# Patient Record
Sex: FEMALE | Race: WHITE | Employment: PART TIME | URBAN - METROPOLITAN AREA
[De-identification: names, ages, dates, MRNs, and addresses within clinical notes are randomized per-mention and may not be internally consistent; named-entity substitution may affect disease eponyms.]

---

## 2017-04-19 RX ORDER — ESTRADIOL 0.07 MG/D
1 PATCH TRANSDERMAL
COMMUNITY
End: 2017-04-28

## 2017-04-19 RX ORDER — HYDROCHLOROTHIAZIDE 12.5 MG/1
12.5 TABLET ORAL DAILY
COMMUNITY
End: 2017-04-28

## 2017-04-20 ENCOUNTER — HOSPITAL ENCOUNTER (OUTPATIENT)
Dept: LAB | Age: 52
Discharge: HOME | End: 2017-04-20
Payer: COMMERCIAL

## 2017-04-20 ENCOUNTER — HOSPITAL ENCOUNTER (OUTPATIENT)
Dept: OTHER | Age: 52
Discharge: HOME | End: 2017-04-20
Payer: COMMERCIAL

## 2017-04-20 ENCOUNTER — HOSPITAL ENCOUNTER (OUTPATIENT)
Dept: PREADMISSION TESTING | Age: 52
Discharge: HOME | End: 2017-04-20
Payer: COMMERCIAL

## 2017-04-20 DIAGNOSIS — Z01.818 PRE-OP TESTING: ICD-10-CM

## 2017-04-20 DIAGNOSIS — Z01.811 PRE-OP CHEST EXAM: ICD-10-CM

## 2017-04-20 LAB
ABO + RH BLD: NORMAL
ALBUMIN SERPL BCP-MCNC: 3.6 G/DL (ref 3.4–5)
ALBUMIN/GLOB SERPL: 1.4 {RATIO} (ref 0.8–1.7)
ALP SERPL-CCNC: 96 U/L (ref 45–117)
ALT SERPL-CCNC: 29 U/L (ref 13–56)
ANION GAP BLD CALC-SCNC: 10 MMOL/L (ref 3–18)
APPEARANCE UR: CLEAR
APTT PPP: 28.6 SEC (ref 23–36.4)
AST SERPL W P-5'-P-CCNC: 19 U/L (ref 15–37)
BASOPHILS # BLD AUTO: 0 K/UL (ref 0–0.06)
BASOPHILS # BLD: 0 % (ref 0–2)
BILIRUB SERPL-MCNC: 0.2 MG/DL (ref 0.2–1)
BILIRUB UR QL: NEGATIVE
BLOOD GROUP ANTIBODIES SERPL: NORMAL
BUN SERPL-MCNC: 14 MG/DL (ref 7–18)
BUN/CREAT SERPL: 23 (ref 12–20)
CALCIUM SERPL-MCNC: 9.7 MG/DL (ref 8.5–10.1)
CHLORIDE SERPL-SCNC: 104 MMOL/L (ref 100–108)
CO2 SERPL-SCNC: 28 MMOL/L (ref 21–32)
COLOR UR: YELLOW
CREAT SERPL-MCNC: 0.62 MG/DL (ref 0.6–1.3)
DIFFERENTIAL METHOD BLD: NORMAL
EOSINOPHIL # BLD: 0.3 K/UL (ref 0–0.4)
EOSINOPHIL NFR BLD: 4 % (ref 0–5)
ERYTHROCYTE [DISTWIDTH] IN BLOOD BY AUTOMATED COUNT: 13.9 % (ref 11.6–14.5)
GLOBULIN SER CALC-MCNC: 2.6 G/DL (ref 2–4)
GLUCOSE SERPL-MCNC: 116 MG/DL (ref 74–99)
GLUCOSE UR STRIP.AUTO-MCNC: NEGATIVE MG/DL
HCT VFR BLD AUTO: 37.8 % (ref 35–45)
HGB BLD-MCNC: 12.3 G/DL (ref 12–16)
HGB UR QL STRIP: NEGATIVE
INR PPP: 0.9 (ref 0.8–1.2)
KETONES UR QL STRIP.AUTO: NEGATIVE MG/DL
LEUKOCYTE ESTERASE UR QL STRIP.AUTO: NEGATIVE
LYMPHOCYTES # BLD AUTO: 24 % (ref 21–52)
LYMPHOCYTES # BLD: 1.9 K/UL (ref 0.9–3.6)
MCH RBC QN AUTO: 28.9 PG (ref 24–34)
MCHC RBC AUTO-ENTMCNC: 32.5 G/DL (ref 31–37)
MCV RBC AUTO: 88.7 FL (ref 74–97)
MONOCYTES # BLD: 0.4 K/UL (ref 0.05–1.2)
MONOCYTES NFR BLD AUTO: 5 % (ref 3–10)
NEUTS SEG # BLD: 5.1 K/UL (ref 1.8–8)
NEUTS SEG NFR BLD AUTO: 67 % (ref 40–73)
NITRITE UR QL STRIP.AUTO: NEGATIVE
PH UR STRIP: 6.5 [PH] (ref 5–8)
PLATELET # BLD AUTO: 333 K/UL (ref 135–420)
PMV BLD AUTO: 10.1 FL (ref 9.2–11.8)
POTASSIUM SERPL-SCNC: 4 MMOL/L (ref 3.5–5.5)
PROT SERPL-MCNC: 6.2 G/DL (ref 6.4–8.2)
PROT UR STRIP-MCNC: NEGATIVE MG/DL
PROTHROMBIN TIME: 12.2 SEC (ref 11.5–15.2)
RBC # BLD AUTO: 4.26 M/UL (ref 4.2–5.3)
SODIUM SERPL-SCNC: 142 MMOL/L (ref 136–145)
SP GR UR REFRACTOMETRY: 1.01 (ref 1–1.03)
SPECIMEN EXP DATE BLD: NORMAL
UROBILINOGEN UR QL STRIP.AUTO: 0.2 EU/DL (ref 0.2–1)
WBC # BLD AUTO: 7.7 K/UL (ref 4.6–13.2)

## 2017-04-20 PROCEDURE — 86900 BLOOD TYPING SEROLOGIC ABO: CPT | Performed by: NEUROLOGICAL SURGERY

## 2017-04-20 PROCEDURE — 71020 XR CHEST PA LAT: CPT

## 2017-04-20 PROCEDURE — 81003 URINALYSIS AUTO W/O SCOPE: CPT | Performed by: NEUROLOGICAL SURGERY

## 2017-04-20 PROCEDURE — 85025 COMPLETE CBC W/AUTO DIFF WBC: CPT | Performed by: NEUROLOGICAL SURGERY

## 2017-04-20 PROCEDURE — 87086 URINE CULTURE/COLONY COUNT: CPT | Performed by: NEUROLOGICAL SURGERY

## 2017-04-20 PROCEDURE — 80053 COMPREHEN METABOLIC PANEL: CPT | Performed by: NEUROLOGICAL SURGERY

## 2017-04-20 PROCEDURE — 36415 COLL VENOUS BLD VENIPUNCTURE: CPT | Performed by: NEUROLOGICAL SURGERY

## 2017-04-20 PROCEDURE — 85730 THROMBOPLASTIN TIME PARTIAL: CPT | Performed by: NEUROLOGICAL SURGERY

## 2017-04-20 PROCEDURE — 93005 ELECTROCARDIOGRAM TRACING: CPT

## 2017-04-20 PROCEDURE — 85610 PROTHROMBIN TIME: CPT | Performed by: NEUROLOGICAL SURGERY

## 2017-04-22 LAB
BACTERIA SPEC CULT: NORMAL
SERVICE CMNT-IMP: NORMAL

## 2017-04-25 LAB
ATRIAL RATE: 89 BPM
CALCULATED P AXIS, ECG09: 55 DEGREES
CALCULATED R AXIS, ECG10: 38 DEGREES
CALCULATED T AXIS, ECG11: 62 DEGREES
DIAGNOSIS, 93000: NORMAL
P-R INTERVAL, ECG05: 136 MS
Q-T INTERVAL, ECG07: 368 MS
QRS DURATION, ECG06: 88 MS
QTC CALCULATION (BEZET), ECG08: 447 MS
VENTRICULAR RATE, ECG03: 89 BPM

## 2017-04-26 ENCOUNTER — ANESTHESIA EVENT (OUTPATIENT)
Dept: SURGERY | Age: 52
DRG: 460 | End: 2017-04-26
Payer: COMMERCIAL

## 2017-04-27 ENCOUNTER — APPOINTMENT (OUTPATIENT)
Dept: GENERAL RADIOLOGY | Age: 52
DRG: 460 | End: 2017-04-27
Attending: NEUROLOGICAL SURGERY
Payer: COMMERCIAL

## 2017-04-27 ENCOUNTER — HOSPITAL ENCOUNTER (INPATIENT)
Age: 52
LOS: 1 days | Discharge: HOME | DRG: 460 | End: 2017-04-28
Attending: NEUROLOGICAL SURGERY | Admitting: NEUROLOGICAL SURGERY
Payer: COMMERCIAL

## 2017-04-27 ENCOUNTER — ANESTHESIA (OUTPATIENT)
Dept: SURGERY | Age: 52
DRG: 460 | End: 2017-04-27
Payer: COMMERCIAL

## 2017-04-27 PROBLEM — M48.061 LUMBAR STENOSIS: Status: ACTIVE | Noted: 2017-04-27

## 2017-04-27 PROCEDURE — 77030002933 HC SUT MCRYL J&J -A: Performed by: NEUROLOGICAL SURGERY

## 2017-04-27 PROCEDURE — 76210000017 HC OR PH I REC 1.5 TO 2 HR: Performed by: NEUROLOGICAL SURGERY

## 2017-04-27 PROCEDURE — 74011250636 HC RX REV CODE- 250/636

## 2017-04-27 PROCEDURE — 74011000250 HC RX REV CODE- 250

## 2017-04-27 PROCEDURE — 77030018836 HC SOL IRR NACL ICUM -A: Performed by: NEUROLOGICAL SURGERY

## 2017-04-27 PROCEDURE — C1713 ANCHOR/SCREW BN/BN,TIS/BN: HCPCS | Performed by: NEUROLOGICAL SURGERY

## 2017-04-27 PROCEDURE — C1729 CATH, DRAINAGE: HCPCS | Performed by: NEUROLOGICAL SURGERY

## 2017-04-27 PROCEDURE — 74011000250 HC RX REV CODE- 250: Performed by: FAMILY MEDICINE

## 2017-04-27 PROCEDURE — 74011250637 HC RX REV CODE- 250/637: Performed by: NURSE ANESTHETIST, CERTIFIED REGISTERED

## 2017-04-27 PROCEDURE — 77030028990 HC ADH TISS DERMFLX CHMP -B: Performed by: NEUROLOGICAL SURGERY

## 2017-04-27 PROCEDURE — 77030029251 HC SPCR SPN GLMB -K1: Performed by: NEUROLOGICAL SURGERY

## 2017-04-27 PROCEDURE — 77030018673: Performed by: NEUROLOGICAL SURGERY

## 2017-04-27 PROCEDURE — 74011000250 HC RX REV CODE- 250: Performed by: ANESTHESIOLOGY

## 2017-04-27 PROCEDURE — 77030011265 HC ELECTRD BLD HEX COVD -A: Performed by: NEUROLOGICAL SURGERY

## 2017-04-27 PROCEDURE — 77030011264 HC ELECTRD BLD EXT COVD -A: Performed by: NEUROLOGICAL SURGERY

## 2017-04-27 PROCEDURE — 77030033827 HC SLV COMPR SCD THGH COVD -B: Performed by: NEUROLOGICAL SURGERY

## 2017-04-27 PROCEDURE — 77030018846 HC SOL IRR STRL H20 ICUM -A: Performed by: NEUROLOGICAL SURGERY

## 2017-04-27 PROCEDURE — 77030005515 HC CATH URETH FOL14 BARD -B: Performed by: NEUROLOGICAL SURGERY

## 2017-04-27 PROCEDURE — 77030027138 HC INCENT SPIROMETER -A

## 2017-04-27 PROCEDURE — 74011250636 HC RX REV CODE- 250/636: Performed by: FAMILY MEDICINE

## 2017-04-27 PROCEDURE — 74011250637 HC RX REV CODE- 250/637: Performed by: ANESTHESIOLOGY

## 2017-04-27 PROCEDURE — 74011250636 HC RX REV CODE- 250/636: Performed by: NURSE ANESTHETIST, CERTIFIED REGISTERED

## 2017-04-27 PROCEDURE — 65270000029 HC RM PRIVATE

## 2017-04-27 PROCEDURE — 77030019908 HC STETH ESOPH SIMS -A: Performed by: ANESTHESIOLOGY

## 2017-04-27 PROCEDURE — 77030008477 HC STYL SATN SLP COVD -A: Performed by: ANESTHESIOLOGY

## 2017-04-27 PROCEDURE — 77030018723 HC ELCTRD BLD COVD -A: Performed by: NEUROLOGICAL SURGERY

## 2017-04-27 PROCEDURE — 0ST20ZZ RESECTION OF LUMBAR VERTEBRAL DISC, OPEN APPROACH: ICD-10-PCS | Performed by: NEUROLOGICAL SURGERY

## 2017-04-27 PROCEDURE — 74011250637 HC RX REV CODE- 250/637: Performed by: NEUROLOGICAL SURGERY

## 2017-04-27 PROCEDURE — 76060000039 HC ANESTHESIA 4 TO 4.5 HR: Performed by: NEUROLOGICAL SURGERY

## 2017-04-27 PROCEDURE — 77030003028 HC SUT VCRL J&J -A: Performed by: NEUROLOGICAL SURGERY

## 2017-04-27 PROCEDURE — 74011250636 HC RX REV CODE- 250/636: Performed by: NEUROLOGICAL SURGERY

## 2017-04-27 PROCEDURE — 77030020269 HC MISC IMPL: Performed by: NEUROLOGICAL SURGERY

## 2017-04-27 PROCEDURE — 77030012406 HC DRN WND PENRS BARD -A: Performed by: NEUROLOGICAL SURGERY

## 2017-04-27 PROCEDURE — 77030003029 HC SUT VCRL J&J -B: Performed by: NEUROLOGICAL SURGERY

## 2017-04-27 PROCEDURE — 77030034169 HC GRFT BN BIOACTV INTRFC 1G BSTEB -F: Performed by: NEUROLOGICAL SURGERY

## 2017-04-27 PROCEDURE — 77030031359 HC BLD BN MILL DISP STRY -E: Performed by: NEUROLOGICAL SURGERY

## 2017-04-27 PROCEDURE — 77030018842 HC SOL IRR SOD CL 9% BAXT -A: Performed by: NEUROLOGICAL SURGERY

## 2017-04-27 PROCEDURE — 77030004391 HC BUR FLUT MEDT -C: Performed by: NEUROLOGICAL SURGERY

## 2017-04-27 PROCEDURE — 74011000272 HC RX REV CODE- 272: Performed by: NEUROLOGICAL SURGERY

## 2017-04-27 PROCEDURE — 77030012602 HC SPNG PTTY NEUR J&J -B: Performed by: NEUROLOGICAL SURGERY

## 2017-04-27 PROCEDURE — 74011000250 HC RX REV CODE- 250: Performed by: NEUROLOGICAL SURGERY

## 2017-04-27 PROCEDURE — 00BT0ZZ EXCISION OF SPINAL MENINGES, OPEN APPROACH: ICD-10-PCS | Performed by: NEUROLOGICAL SURGERY

## 2017-04-27 PROCEDURE — 77030021510 HC CLP MNTR NEURO M5 NUVA -E: Performed by: NEUROLOGICAL SURGERY

## 2017-04-27 PROCEDURE — 77030010507 HC ADH SKN DERMBND J&J -B: Performed by: NEUROLOGICAL SURGERY

## 2017-04-27 PROCEDURE — 77030013079 HC BLNKT BAIR HGGR 3M -A: Performed by: ANESTHESIOLOGY

## 2017-04-27 PROCEDURE — 0SG00A1 FUSION LUM JT W INTBD FUS DEV, POST APPR P COL, OPEN: ICD-10-PCS | Performed by: NEUROLOGICAL SURGERY

## 2017-04-27 PROCEDURE — 77030032490 HC SLV COMPR SCD KNE COVD -B: Performed by: NEUROLOGICAL SURGERY

## 2017-04-27 PROCEDURE — 74011000250 HC RX REV CODE- 250: Performed by: NURSE ANESTHETIST, CERTIFIED REGISTERED

## 2017-04-27 PROCEDURE — 77030008683 HC TU ET CUF COVD -A: Performed by: ANESTHESIOLOGY

## 2017-04-27 PROCEDURE — 77030011640 HC PAD GRND REM COVD -A: Performed by: NEUROLOGICAL SURGERY

## 2017-04-27 PROCEDURE — 77030031878 HC NDL SPN ACC SYS I-PAS NUVA -D: Performed by: NEUROLOGICAL SURGERY

## 2017-04-27 PROCEDURE — C9113 INJ PANTOPRAZOLE SODIUM, VIA: HCPCS | Performed by: FAMILY MEDICINE

## 2017-04-27 PROCEDURE — 76010000175 HC OR TIME 4 TO 4.5 HR INTENSV-TIER 1: Performed by: NEUROLOGICAL SURGERY

## 2017-04-27 DEVICE — CALIBER SPACER 10 X 26MM, 9-13MM
Type: IMPLANTABLE DEVICE | Site: SPINE LUMBAR | Status: FUNCTIONAL
Brand: CALIBER

## 2017-04-27 DEVICE — INTERFACE IS A SYNTHETIC BIOACTIVE BONE GRAFT FOR USE IN THE REPAIR OF OSSEOUS DEFECTS. IT IS SUPPLIED AS IRREGULAR SYNTHETIC GRANULES OF BIOACTIVE GLASS (45S5 BIOGLASS), SIZED FROM 200 MICRONS TO 420 MICRONS. WHEN IMPLANTED IN LIVING TISSUE, THE MATERIAL UNDERGOES A TIME DEPENDENT SURFACE MODIFICATION. THE SURFACE REACTION RESULTS IN THE FORMATION OF A CALCIUM PHOSPHATE LAYER, WHICH IS EQUIVALENT IN COMPOSITION AND STRUCTURE TO THE HYDROXYAPATITE FOUND IN BONE MINERAL. THE BIOLOGICAL APATITE LAYER OF THE GRANULES PROVIDES AN OSTEOCONDUCTIVE SCAFFOLD FOR THE GENERATION OF NEW OSSEOUS TISSUE. NEW BONE INFILTRATES AROUND THE GRANULES ALLOWING THE REPAIR OF THE DEFECT AS THE GRANULES ARE ABSORBED.
Type: IMPLANTABLE DEVICE | Site: SPINE LUMBAR | Status: FUNCTIONAL
Brand: INTERFACE

## 2017-04-27 DEVICE — 5.5MM CURVED ROD, TITANIUM ALLOY, 55MM LENGTH
Type: IMPLANTABLE DEVICE | Site: SPINE LUMBAR | Status: FUNCTIONAL
Brand: CREO

## 2017-04-27 DEVICE — GRAFT BNE SUB 7.5GM PTTY SYN SIGNAFUSE: Type: IMPLANTABLE DEVICE | Site: SPINE LUMBAR | Status: FUNCTIONAL

## 2017-04-27 DEVICE — 5.5 LOCKING CAP, CREO
Type: IMPLANTABLE DEVICE | Site: SPINE LUMBAR | Status: FUNCTIONAL
Brand: CREO

## 2017-04-27 RX ORDER — SODIUM CHLORIDE, SODIUM LACTATE, POTASSIUM CHLORIDE, CALCIUM CHLORIDE 600; 310; 30; 20 MG/100ML; MG/100ML; MG/100ML; MG/100ML
75 INJECTION, SOLUTION INTRAVENOUS CONTINUOUS
Status: DISCONTINUED | OUTPATIENT
Start: 2017-04-27 | End: 2017-04-27 | Stop reason: HOSPADM

## 2017-04-27 RX ORDER — MORPHINE SULFATE 10 MG/ML
4-6 INJECTION, SOLUTION INTRAMUSCULAR; INTRAVENOUS
Status: DISCONTINUED | OUTPATIENT
Start: 2017-04-27 | End: 2017-04-28 | Stop reason: HOSPADM

## 2017-04-27 RX ORDER — DEXAMETHASONE SODIUM PHOSPHATE 4 MG/ML
4 INJECTION, SOLUTION INTRA-ARTICULAR; INTRALESIONAL; INTRAMUSCULAR; INTRAVENOUS; SOFT TISSUE EVERY 8 HOURS
Status: COMPLETED | OUTPATIENT
Start: 2017-04-27 | End: 2017-04-28

## 2017-04-27 RX ORDER — ONDANSETRON 2 MG/ML
INJECTION INTRAMUSCULAR; INTRAVENOUS AS NEEDED
Status: DISCONTINUED | OUTPATIENT
Start: 2017-04-27 | End: 2017-04-27 | Stop reason: HOSPADM

## 2017-04-27 RX ORDER — DOCUSATE SODIUM 100 MG/1
100 CAPSULE, LIQUID FILLED ORAL 2 TIMES DAILY
Status: DISCONTINUED | OUTPATIENT
Start: 2017-04-27 | End: 2017-04-28 | Stop reason: HOSPADM

## 2017-04-27 RX ORDER — ONDANSETRON 2 MG/ML
4 INJECTION INTRAMUSCULAR; INTRAVENOUS
Status: DISCONTINUED | OUTPATIENT
Start: 2017-04-27 | End: 2017-04-28 | Stop reason: HOSPADM

## 2017-04-27 RX ORDER — SUCCINYLCHOLINE CHLORIDE 20 MG/ML
INJECTION INTRAMUSCULAR; INTRAVENOUS AS NEEDED
Status: DISCONTINUED | OUTPATIENT
Start: 2017-04-27 | End: 2017-04-27 | Stop reason: HOSPADM

## 2017-04-27 RX ORDER — HYDROCHLOROTHIAZIDE 25 MG/1
12.5 TABLET ORAL DAILY
Status: DISCONTINUED | OUTPATIENT
Start: 2017-04-28 | End: 2017-04-28 | Stop reason: HOSPADM

## 2017-04-27 RX ORDER — SCOLOPAMINE TRANSDERMAL SYSTEM 1 MG/1
1.5 PATCH, EXTENDED RELEASE TRANSDERMAL ONCE
Status: COMPLETED | OUTPATIENT
Start: 2017-04-27 | End: 2017-04-28

## 2017-04-27 RX ORDER — SODIUM CHLORIDE 0.9 % (FLUSH) 0.9 %
5-10 SYRINGE (ML) INJECTION AS NEEDED
Status: DISCONTINUED | OUTPATIENT
Start: 2017-04-27 | End: 2017-04-27 | Stop reason: HOSPADM

## 2017-04-27 RX ORDER — FENTANYL CITRATE 50 UG/ML
INJECTION, SOLUTION INTRAMUSCULAR; INTRAVENOUS AS NEEDED
Status: DISCONTINUED | OUTPATIENT
Start: 2017-04-27 | End: 2017-04-27 | Stop reason: HOSPADM

## 2017-04-27 RX ORDER — ONDANSETRON 2 MG/ML
4 INJECTION INTRAMUSCULAR; INTRAVENOUS
Status: DISCONTINUED | OUTPATIENT
Start: 2017-04-27 | End: 2017-04-27 | Stop reason: HOSPADM

## 2017-04-27 RX ORDER — ESMOLOL HYDROCHLORIDE 10 MG/ML
INJECTION INTRAVENOUS AS NEEDED
Status: DISCONTINUED | OUTPATIENT
Start: 2017-04-27 | End: 2017-04-27 | Stop reason: HOSPADM

## 2017-04-27 RX ORDER — DEXAMETHASONE SODIUM PHOSPHATE 4 MG/ML
INJECTION, SOLUTION INTRA-ARTICULAR; INTRALESIONAL; INTRAMUSCULAR; INTRAVENOUS; SOFT TISSUE AS NEEDED
Status: DISCONTINUED | OUTPATIENT
Start: 2017-04-27 | End: 2017-04-27 | Stop reason: HOSPADM

## 2017-04-27 RX ORDER — SODIUM CHLORIDE, SODIUM LACTATE, POTASSIUM CHLORIDE, CALCIUM CHLORIDE 600; 310; 30; 20 MG/100ML; MG/100ML; MG/100ML; MG/100ML
25 INJECTION, SOLUTION INTRAVENOUS CONTINUOUS
Status: DISCONTINUED | OUTPATIENT
Start: 2017-04-27 | End: 2017-04-27 | Stop reason: HOSPADM

## 2017-04-27 RX ORDER — FAMOTIDINE 20 MG/1
20 TABLET, FILM COATED ORAL ONCE
Status: COMPLETED | OUTPATIENT
Start: 2017-04-27 | End: 2017-04-27

## 2017-04-27 RX ORDER — MIDAZOLAM HYDROCHLORIDE 1 MG/ML
INJECTION, SOLUTION INTRAMUSCULAR; INTRAVENOUS AS NEEDED
Status: DISCONTINUED | OUTPATIENT
Start: 2017-04-27 | End: 2017-04-27 | Stop reason: HOSPADM

## 2017-04-27 RX ORDER — VANCOMYCIN HYDROCHLORIDE 1 G/20ML
INJECTION, POWDER, LYOPHILIZED, FOR SOLUTION INTRAVENOUS AS NEEDED
Status: DISCONTINUED | OUTPATIENT
Start: 2017-04-27 | End: 2017-04-27 | Stop reason: HOSPADM

## 2017-04-27 RX ORDER — SODIUM CHLORIDE 0.9 % (FLUSH) 0.9 %
5-10 SYRINGE (ML) INJECTION AS NEEDED
Status: DISCONTINUED | OUTPATIENT
Start: 2017-04-27 | End: 2017-04-28 | Stop reason: HOSPADM

## 2017-04-27 RX ORDER — LIDOCAINE HYDROCHLORIDE 20 MG/ML
INJECTION, SOLUTION EPIDURAL; INFILTRATION; INTRACAUDAL; PERINEURAL AS NEEDED
Status: DISCONTINUED | OUTPATIENT
Start: 2017-04-27 | End: 2017-04-27 | Stop reason: HOSPADM

## 2017-04-27 RX ORDER — METOPROLOL TARTRATE 5 MG/5ML
2 INJECTION INTRAVENOUS
Status: COMPLETED | OUTPATIENT
Start: 2017-04-27 | End: 2017-04-27

## 2017-04-27 RX ORDER — ACETAMINOPHEN 325 MG/1
650 TABLET ORAL
Status: DISCONTINUED | OUTPATIENT
Start: 2017-04-27 | End: 2017-04-28 | Stop reason: HOSPADM

## 2017-04-27 RX ORDER — HYDROMORPHONE HYDROCHLORIDE 1 MG/ML
0.5 INJECTION, SOLUTION INTRAMUSCULAR; INTRAVENOUS; SUBCUTANEOUS
Status: DISCONTINUED | OUTPATIENT
Start: 2017-04-27 | End: 2017-04-27 | Stop reason: HOSPADM

## 2017-04-27 RX ORDER — HYDRALAZINE HYDROCHLORIDE 20 MG/ML
INJECTION INTRAMUSCULAR; INTRAVENOUS AS NEEDED
Status: DISCONTINUED | OUTPATIENT
Start: 2017-04-27 | End: 2017-04-27 | Stop reason: HOSPADM

## 2017-04-27 RX ORDER — SODIUM CHLORIDE 0.9 % (FLUSH) 0.9 %
5-10 SYRINGE (ML) INJECTION EVERY 8 HOURS
Status: DISCONTINUED | OUTPATIENT
Start: 2017-04-27 | End: 2017-04-27 | Stop reason: HOSPADM

## 2017-04-27 RX ORDER — PROPOFOL 10 MG/ML
INJECTION, EMULSION INTRAVENOUS AS NEEDED
Status: DISCONTINUED | OUTPATIENT
Start: 2017-04-27 | End: 2017-04-27 | Stop reason: HOSPADM

## 2017-04-27 RX ORDER — LIDOCAINE HYDROCHLORIDE 10 MG/ML
0.1 INJECTION, SOLUTION EPIDURAL; INFILTRATION; INTRACAUDAL; PERINEURAL AS NEEDED
Status: DISCONTINUED | OUTPATIENT
Start: 2017-04-27 | End: 2017-04-27 | Stop reason: HOSPADM

## 2017-04-27 RX ORDER — CEFAZOLIN SODIUM 2 G/50ML
2 SOLUTION INTRAVENOUS
Status: COMPLETED | OUTPATIENT
Start: 2017-04-27 | End: 2017-04-27

## 2017-04-27 RX ORDER — HYDROMORPHONE HYDROCHLORIDE 2 MG/ML
INJECTION, SOLUTION INTRAMUSCULAR; INTRAVENOUS; SUBCUTANEOUS AS NEEDED
Status: DISCONTINUED | OUTPATIENT
Start: 2017-04-27 | End: 2017-04-27 | Stop reason: HOSPADM

## 2017-04-27 RX ORDER — DIAZEPAM 5 MG/1
5 TABLET ORAL
Status: DISCONTINUED | OUTPATIENT
Start: 2017-04-27 | End: 2017-04-28 | Stop reason: HOSPADM

## 2017-04-27 RX ORDER — METOPROLOL TARTRATE 5 MG/5ML
INJECTION INTRAVENOUS
Status: COMPLETED
Start: 2017-04-27 | End: 2017-04-27

## 2017-04-27 RX ORDER — SODIUM CHLORIDE 0.9 % (FLUSH) 0.9 %
5-10 SYRINGE (ML) INJECTION EVERY 8 HOURS
Status: DISCONTINUED | OUTPATIENT
Start: 2017-04-27 | End: 2017-04-28 | Stop reason: HOSPADM

## 2017-04-27 RX ORDER — DEXTROSE, SODIUM CHLORIDE, AND POTASSIUM CHLORIDE 5; .45; .15 G/100ML; G/100ML; G/100ML
75 INJECTION INTRAVENOUS CONTINUOUS
Status: DISCONTINUED | OUTPATIENT
Start: 2017-04-27 | End: 2017-04-28 | Stop reason: HOSPADM

## 2017-04-27 RX ORDER — CEFAZOLIN SODIUM 2 G/50ML
2 SOLUTION INTRAVENOUS EVERY 8 HOURS
Status: COMPLETED | OUTPATIENT
Start: 2017-04-27 | End: 2017-04-28

## 2017-04-27 RX ORDER — OXYCODONE AND ACETAMINOPHEN 5; 325 MG/1; MG/1
1-2 TABLET ORAL
Status: DISCONTINUED | OUTPATIENT
Start: 2017-04-27 | End: 2017-04-28 | Stop reason: HOSPADM

## 2017-04-27 RX ADMIN — PROPOFOL 180 MG: 10 INJECTION, EMULSION INTRAVENOUS at 14:37

## 2017-04-27 RX ADMIN — METOPROLOL TARTRATE 2 MG: 5 INJECTION INTRAVENOUS at 18:59

## 2017-04-27 RX ADMIN — SODIUM CHLORIDE 40 MG: 9 INJECTION, SOLUTION INTRAMUSCULAR; INTRAVENOUS; SUBCUTANEOUS at 22:32

## 2017-04-27 RX ADMIN — DEXTROSE MONOHYDRATE, SODIUM CHLORIDE, AND POTASSIUM CHLORIDE 75 ML/HR: 50; 4.5; 1.49 INJECTION, SOLUTION INTRAVENOUS at 22:28

## 2017-04-27 RX ADMIN — DEXAMETHASONE SODIUM PHOSPHATE 10 MG: 4 INJECTION, SOLUTION INTRA-ARTICULAR; INTRALESIONAL; INTRAMUSCULAR; INTRAVENOUS; SOFT TISSUE at 14:40

## 2017-04-27 RX ADMIN — HYDRALAZINE HYDROCHLORIDE 4 MG: 20 INJECTION INTRAMUSCULAR; INTRAVENOUS at 18:02

## 2017-04-27 RX ADMIN — DOCUSATE SODIUM 100 MG: 100 CAPSULE, LIQUID FILLED ORAL at 22:31

## 2017-04-27 RX ADMIN — ESMOLOL HYDROCHLORIDE 30 MG: 10 INJECTION INTRAVENOUS at 18:09

## 2017-04-27 RX ADMIN — SUCCINYLCHOLINE CHLORIDE 80 MG: 20 INJECTION INTRAMUSCULAR; INTRAVENOUS at 14:37

## 2017-04-27 RX ADMIN — HYDRALAZINE HYDROCHLORIDE 4 MG: 20 INJECTION INTRAMUSCULAR; INTRAVENOUS at 17:29

## 2017-04-27 RX ADMIN — LIDOCAINE HYDROCHLORIDE 50 MG: 20 INJECTION, SOLUTION EPIDURAL; INFILTRATION; INTRACAUDAL; PERINEURAL at 14:37

## 2017-04-27 RX ADMIN — FAMOTIDINE 20 MG: 20 TABLET, FILM COATED ORAL at 10:58

## 2017-04-27 RX ADMIN — ONDANSETRON 4 MG: 2 INJECTION INTRAMUSCULAR; INTRAVENOUS at 14:41

## 2017-04-27 RX ADMIN — HYDROMORPHONE HYDROCHLORIDE 1.6 MG: 2 INJECTION, SOLUTION INTRAMUSCULAR; INTRAVENOUS; SUBCUTANEOUS at 18:47

## 2017-04-27 RX ADMIN — PROPOFOL 100 MG: 10 INJECTION, EMULSION INTRAVENOUS at 16:12

## 2017-04-27 RX ADMIN — METOPROLOL TARTRATE 2 MG: 5 INJECTION INTRAVENOUS at 19:38

## 2017-04-27 RX ADMIN — SODIUM CHLORIDE, SODIUM LACTATE, POTASSIUM CHLORIDE, AND CALCIUM CHLORIDE: 600; 310; 30; 20 INJECTION, SOLUTION INTRAVENOUS at 17:50

## 2017-04-27 RX ADMIN — ESMOLOL HYDROCHLORIDE 30 MG: 10 INJECTION INTRAVENOUS at 17:58

## 2017-04-27 RX ADMIN — ESMOLOL HYDROCHLORIDE 30 MG: 10 INJECTION INTRAVENOUS at 17:53

## 2017-04-27 RX ADMIN — CEFAZOLIN SODIUM 2 G: 2 SOLUTION INTRAVENOUS at 18:27

## 2017-04-27 RX ADMIN — SODIUM CHLORIDE, SODIUM LACTATE, POTASSIUM CHLORIDE, AND CALCIUM CHLORIDE: 600; 310; 30; 20 INJECTION, SOLUTION INTRAVENOUS at 14:32

## 2017-04-27 RX ADMIN — SODIUM CHLORIDE, SODIUM LACTATE, POTASSIUM CHLORIDE, AND CALCIUM CHLORIDE 75 ML/HR: 600; 310; 30; 20 INJECTION, SOLUTION INTRAVENOUS at 19:23

## 2017-04-27 RX ADMIN — METOPROLOL TARTRATE 1 MG: 5 INJECTION INTRAVENOUS at 20:14

## 2017-04-27 RX ADMIN — DEXAMETHASONE SODIUM PHOSPHATE 4 MG: 4 INJECTION, SOLUTION INTRAMUSCULAR; INTRAVENOUS at 23:45

## 2017-04-27 RX ADMIN — Medication 10 ML: at 22:00

## 2017-04-27 RX ADMIN — CEFAZOLIN SODIUM 2 G: 2 SOLUTION INTRAVENOUS at 15:03

## 2017-04-27 RX ADMIN — METOPROLOL TARTRATE 1 MG: 5 INJECTION INTRAVENOUS at 19:19

## 2017-04-27 RX ADMIN — METOPROLOL TARTRATE 2 MG: 5 INJECTION INTRAVENOUS at 19:57

## 2017-04-27 RX ADMIN — FENTANYL CITRATE 50 MCG: 50 INJECTION, SOLUTION INTRAMUSCULAR; INTRAVENOUS at 17:24

## 2017-04-27 RX ADMIN — FENTANYL CITRATE 50 MCG: 50 INJECTION, SOLUTION INTRAMUSCULAR; INTRAVENOUS at 14:32

## 2017-04-27 RX ADMIN — FENTANYL CITRATE 50 MCG: 50 INJECTION, SOLUTION INTRAMUSCULAR; INTRAVENOUS at 17:07

## 2017-04-27 RX ADMIN — FENTANYL CITRATE 50 MCG: 50 INJECTION, SOLUTION INTRAMUSCULAR; INTRAVENOUS at 14:37

## 2017-04-27 RX ADMIN — SODIUM CHLORIDE, SODIUM LACTATE, POTASSIUM CHLORIDE, AND CALCIUM CHLORIDE: 600; 310; 30; 20 INJECTION, SOLUTION INTRAVENOUS at 15:52

## 2017-04-27 RX ADMIN — HYDRALAZINE HYDROCHLORIDE 4 MG: 20 INJECTION INTRAMUSCULAR; INTRAVENOUS at 17:38

## 2017-04-27 RX ADMIN — FENTANYL CITRATE 50 MCG: 50 INJECTION, SOLUTION INTRAMUSCULAR; INTRAVENOUS at 18:47

## 2017-04-27 RX ADMIN — PROPOFOL 20 MG: 10 INJECTION, EMULSION INTRAVENOUS at 14:45

## 2017-04-27 RX ADMIN — CEFAZOLIN SODIUM 2 G: 2 SOLUTION INTRAVENOUS at 22:29

## 2017-04-27 RX ADMIN — HYDROMORPHONE HYDROCHLORIDE 0.4 MG: 2 INJECTION, SOLUTION INTRAMUSCULAR; INTRAVENOUS; SUBCUTANEOUS at 14:32

## 2017-04-27 RX ADMIN — METOPROLOL TARTRATE 2 MG: 5 INJECTION INTRAVENOUS at 19:08

## 2017-04-27 RX ADMIN — FENTANYL CITRATE 50 MCG: 50 INJECTION, SOLUTION INTRAMUSCULAR; INTRAVENOUS at 17:49

## 2017-04-27 RX ADMIN — MIDAZOLAM HYDROCHLORIDE 2 MG: 1 INJECTION, SOLUTION INTRAMUSCULAR; INTRAVENOUS at 14:32

## 2017-04-27 NOTE — BRIEF OP NOTE
BRIEF OPERATIVE NOTE    Date of Procedure: 4/27/2017   Preoperative Diagnosis: lumbar stenosis   m48.06  Postoperative Diagnosis: lumbar stenosis   m48.06    Procedure(s):  minimally invasive right lumbar 4 - lumbar 5 laminotomies,cyst resection  and TRANSFORAMINAL LUMBAR INTERBODY FUSION ,pedicle screw stabilization  Surgeon(s) and Role:     * Janiya Harrison MD - Primary         Assistant Staff:       Surgical Staff:  Circ-1: Adam Block RN  Circ-Relief: José Miguel Lott RN; Eugenio Quintero RN  Radiology Technician: Nella Hill  Scrub Tech-1: Karen Augustine; Mikaela Frost  Scrub Tech-2: Virgle Emigdio  Surg Asst-1: Andrea Lucian  Event Time In   Incision Start 1509   Incision Close 1820     Anesthesia: General   Estimated Blood Loss: 50  Specimens: * No specimens in log *   Findings: -   Complications: --  Implants:   Implant Name Type Inv.  Item Serial No.  Lot No. LRB No. Used Action   GRAFT BNE BIOACTV INTERFC 1GM --  - [id number]  GRAFT BNE BIOACTV INTERFC 1GM --   [employer] [id number] Right 1 Implanted   GRAFT BNE PUTTY BIOACTV 7.5GM -- SIGNAFUSE - [id number]  GRAFT BNE PUTTY BIOACTV 7.5GM -- SIGNAFUSE  [employer] [id number] Right 1 Implanted   CAP SPNE LCK CREO 5.5MM --  - [id number]  CAP SPNE LCK CREO 5.5MM --   [employer] [id number] Right 1 Implanted   ESTEPHANIE SPNE CRV TI 5.5X55MM -- CREO - [id number]  ESTEPHANIE SPNE CRV TI 5.5X55MM -- CREO  [employer] 1111 Right 1 Implanted   SPACER SPNE [id number] -- CALIBER - [id number]  SPACER SPNE [id number] -- CALIBER  [employer] 1111 Right 1 Implanted   6.5 x 50mm Creo MIS screw    [employer] 1111 Right 1 Implanted   6.5 x 55mm Creo MIS Screw         1111 Right 1 Implanted

## 2017-04-27 NOTE — ANESTHESIA PREPROCEDURE EVALUATION
Anesthetic History     PONV          Review of Systems / Medical History  Patient summary reviewed and pertinent labs reviewed    Pulmonary  Within defined limits                 Neuro/Psych              Cardiovascular    Hypertension                   GI/Hepatic/Renal     GERD           Endo/Other        Arthritis     Other Findings   Comments:   Risk Factors for Postoperative nausea/vomiting:       History of postoperative nausea/vomiting? YES       Female? YES       Motion sickness? NO       Intended opioid administration for postoperative analgesia?   YES           Physical Exam    Airway  Mallampati: II  TM Distance: 4 - 6 cm  Neck ROM: normal range of motion   Mouth opening: Normal     Cardiovascular    Rhythm: regular  Rate: normal         Dental    Dentition: Poor dentition     Pulmonary  Breath sounds clear to auscultation               Abdominal  GI exam deferred       Other Findings            Anesthetic Plan    ASA: 2  Anesthesia type: general          Induction: Intravenous  Anesthetic plan and risks discussed with: Patient

## 2017-04-27 NOTE — H&P
History and Physical reviewed; I have examined the patient and there are no pertinent changes.     Tawanna Pisano MD   1:38 PM 4/27/2017

## 2017-04-27 NOTE — IP AVS SNAPSHOT
Current Discharge Medication List  
  
START taking these medications Dose & Instructions Dispensing Information Comments Morning Noon Evening Bedtime  
 methocarbamol 500 mg tablet Commonly known as:  ROBAXIN Your last dose was: Your next dose is:    
   
   
 Dose:  500 mg Take 1 Tab by mouth four (4) times daily as needed. Indications: MUSCLE SPASM Quantity:  20 Tab Refills:  0  
     
   
   
   
  
 oxyCODONE-acetaminophen 5-325 mg per tablet Commonly known as:  PERCOCET Your last dose was: Your next dose is:    
   
   
 Dose:  1-2 Tab Take 1-2 Tabs by mouth every four (4) hours as needed. Max Daily Amount: 12 Tabs. Indications: Pain Quantity:  30 Tab Refills:  0 CONTINUE these medications which have CHANGED Dose & Instructions Dispensing Information Comments Morning Noon Evening Bedtime  
 estradiol 0.075 mg/24 hr  
Commonly known as:  Сергей Glass What changed:  Another medication with the same name was removed. Continue taking this medication, and follow the directions you see here. Your last dose was: Your next dose is:    
   
   
  Refills:  0  
     
   
   
   
  
 hydroCHLOROthiazide 12.5 mg capsule Commonly known as:  Devora Van What changed:  Another medication with the same name was removed. Continue taking this medication, and follow the directions you see here. Your last dose was: Your next dose is:    
   
   
  Refills:  0 CONTINUE these medications which have NOT CHANGED Dose & Instructions Dispensing Information Comments Morning Noon Evening Bedtime  
 dicloxacillin 500 mg capsule Commonly known as:  Brooklyn Waldrop Your last dose was: Your next dose is:    
   
   
  Refills:  0  
     
   
   
   
  
 omeprazole 20 mg capsule Commonly known as:  PRILOSEC Your last dose was: Your next dose is: Dose:  20 mg Take 20 mg by mouth daily. Refills:  0  
     
   
   
   
  
 THERAGRAN PO Your last dose was: Your next dose is: Take  by mouth. Refills:  0  
     
   
   
   
  
 VITAMIN D2 50,000 unit capsule Generic drug:  ergocalciferol Your last dose was: Your next dose is:    
   
   
 two (2) times a week. Refills:  0 STOP taking these medications FISH OIL 1,000 mg (120 mg-180 mg) Cap Generic drug:  Omega-3-DHA-EPA-Fish Oil  
   
  
 naproxen sodium 220 mg Cap PRILOSEC OTC PO Where to Get Your Medications Information on where to get these meds will be given to you by the nurse or doctor. ! Ask your nurse or doctor about these medications  
  methocarbamol 500 mg tablet  
 oxyCODONE-acetaminophen 5-325 mg per tablet

## 2017-04-27 NOTE — IP AVS SNAPSHOT
[address] Patient: Charley Albarado MRN: [id number] WPI:[birth year]75[birth year] You are allergic to the following No active allergies Recent Documentation Height Weight BMI OB Status Smoking Status 1.753 m 95.3 kg 31.01 kg/m2 Hysterectomy Never Smoker Emergency Contacts Name Discharge Info Relation Home Work Mobile [address] A DISCHARGE CAREGIVER [3] Spouse [3] [phone] About your hospitalization You were admitted on:  April 27, 2017 You last received care in the:  [address] [place] [address],[address] You were discharged on:  April 28, 2017 Unit phone number:  [phone] Why you were hospitalized Your primary diagnosis was:  Not on File Your diagnoses also included:  Lumbar Stenosis Providers Seen During Your Hospitalizations Provider Role Specialty Primary office phone Aaliyah Israel MD Attending Provider Neurosurgery [phone] Your Primary Care Physician (PCP) Primary Care Physician Office Phone Office Fax [address] Follow-up Information Follow up With Details Comments Contact Info Lexis Verdin MD   [address] [place] [address] 
[phone] Aaliyah Israel MD Schedule an appointment as soon as possible for a visit  [address] [place] [address] [phone] Current Discharge Medication List  
  
START taking these medications Dose & Instructions Dispensing Information Comments Morning Noon Evening Bedtime  
 methocarbamol 500 mg tablet Commonly known as:  ROBAXIN Your last dose was: Your next dose is:    
   
   
 Dose:  500 mg Take 1 Tab by mouth four (4) times daily as needed. Indications: MUSCLE SPASM Quantity:  20 Tab Refills:  0 oxyCODONE-acetaminophen 5-325 mg per tablet Commonly known as:  PERCOCET Your last dose was: Your next dose is:    
   
   
 Dose:  1-2 Tab Take 1-2 Tabs by mouth every four (4) hours as needed. Max Daily Amount: 12 Tabs. Indications: Pain Quantity:  30 Tab Refills:  0 CONTINUE these medications which have CHANGED Dose & Instructions Dispensing Information Comments Morning Noon Evening Bedtime  
 estradiol 0.075 mg/24 hr  
Commonly known as:  Orysia Abide What changed:  Another medication with the same name was removed. Continue taking this medication, and follow the directions you see here. Your last dose was: Your next dose is:    
   
   
  Refills:  0  
     
   
   
   
  
 hydroCHLOROthiazide 12.5 mg capsule Commonly known as:  Chrystal Menendez What changed:  Another medication with the same name was removed. Continue taking this medication, and follow the directions you see here. Your last dose was: Your next dose is:    
   
   
  Refills:  0 CONTINUE these medications which have NOT CHANGED Dose & Instructions Dispensing Information Comments Morning Noon Evening Bedtime  
 dicloxacillin 500 mg capsule Commonly known as:  Asher Sextonin Your last dose was: Your next dose is:    
   
   
  Refills:  0  
     
   
   
   
  
 omeprazole 20 mg capsule Commonly known as:  PRILOSEC Your last dose was: Your next dose is:    
   
   
 Dose:  20 mg Take 20 mg by mouth daily. Refills:  0  
     
   
   
   
  
 THERAGRAN PO Your last dose was: Your next dose is: Take  by mouth. Refills:  0  
     
   
   
   
  
 VITAMIN D2 50,000 unit capsule Generic drug:  ergocalciferol Your last dose was: Your next dose is:    
   
   
 two (2) times a week. Refills:  0 STOP taking these medications FISH OIL 1,000 mg (120 mg-180 mg) Cap Generic drug:  Omega-3-DHA-EPA-Fish Oil  
   
  
 naproxen sodium 220 mg Cap PRILOSEC OTC PO Where to Get Your Medications Information on where to get these meds will be given to you by the nurse or doctor. ! Ask your nurse or doctor about these medications  
  methocarbamol 500 mg tablet  
 oxyCODONE-acetaminophen 5-325 mg per tablet Discharge Instructions Office:  [phone] Lumbar Spine Discharge Instructions *YOU MUST AVOID SMOKING OR BEING AROUND ANYONE WHO SMOKES. AVOID ALL PRODUCTS THAT CONTAIN NICOTINE. DO NOT TAKE IBUPROFEN, ALEVE, ADVIL, OR OTHER ANTI--INFLAMMATORIES, AS THESE MAY ALTER THE HEALING OF THE FUSION. ACTIVITIES : 
*The first week after surgery 1. You may be up and walking about the house. 2.  Activities around the house, such as washing dishes, fixing light meals, and your own personal care are fine. 3.  Avoid strenuous activities, such as vacuuming, lifting laundry or grocery bags. 4.  Do not lift anything heavier than 1 gallon of milk (or about 5-8 pounds). 5.  Do not bend over to  items from the ground level until 3 months post-op. 6.  Remember the BLTs- No bending, lifting, or twisting. 7. Limit sitting to one hour at a time. 8.  Squat or sit in a chair when removing items from the refrigerator. Put all frequently used items within easy reach. 9.  Carry items close to your body. *Week 2 and beyond 1. You may gradually increase your activities, but still avoid heavy lifting, pushing/pulling. 2.  Walking is the best way to rebuild strength and stamina. Start SLOWLY and gradually increase the distance a little every week. 3.  Walk at a pace that avoids fatigue or severe pain. Do not try to walk several blocks the first day! As you increase the distance, you may feel tired.  If so, stop and rest.  
 4.  You should be able to walk several blocks by your first clinic visit. 5.  Follow-up in the office in 4 weeks. (If you have staples at your incision site, you will need to be seen in 2 weeks.) BATHING and INCISION CARE: 
1. The incision may be tender to touch or feel numb: this is normal. The dressing is similar to steri-strips and will wear off on its own. Edges may be trimmed. 2.  Keep the incision clean and dry. No tub baths. You may shower. Pat the incision dry. 3.  Do not apply any lotions, ointments or oils on the incision. 4.  Use a long handled back brush/sponge to wash feet if you cannot reach them. 5.  Sit to put on pants, socks, and shoes. Do not perform lower body dressing while standing up. 6.  If you notice any excessive swelling, redness, or persistent drainage around the incision, notify the office immediately. DRIVIN. You should not drive until after your follow-up appointment. 2.  You can be in a vehicle for short distances, but if you travel any long distance, please stop about every 30 minutes and walk/stretch. 3.  You should NEVER drive while taking narcotic medication. RETURN TO WORK : 
1. The decision to return to work will be determined on an individual basis. 2.  Many people who have a strenuous job (construction, heavy labor, etc) may need to be off work for up to 12 weeks. 3.  If you need a work note, please let us know as soon as possible, and not the same day you are planning to return to work. NUTRITION : 
1.  Good nutrition is an essential part of healing. 2.  You should eat a balanced diet each day, including fruits, vegetables, dairy products and protein. 3.  Remember to drink plenty of water. 4.  If you have not had a bowel movement within 3 days of surgery, you will need to use a laxative or suppository that can be obtained over-the-counter at your local pharmacy.  
 
MEDICATIONS - 
 1. You may resume the medications you were taking before surgery, with the general exception of (or DO NOT TAKE) non-steroidal anti-inflammatory medications, such as Motrin, Aleve, Advil Naprosyn, or Ibuprofen. 2.  You will receive a prescription for pain medication at discharge from the hospital. The pain medication works best if taken before the pain becomes severe. 3.  To reduce stomach upset, always take the medication with food. 4.  Begin to wean yourself off the pain medication during the second week after discharge. 5.  If you need a refill, please call the office during working hours at least 2 days before your prescription runs out. Do not wait until your bottle is empty to call for a refill. 6.  DO NOT drive if you are taking narcotic pain medications. HOME HEALTH CARE: (Per individual case if indicated) 1. A home health care service has been set-up for you to help assist you once you leave the hospital. 
2.  They will contact you either before you leave the hospital or within 24 hours once you have been discharged home. 3. A nurse will assist you with your dressing changes and a Physical Therapist with help you with your therapy needs. CALL THE OFFICE: 
? If you have severe pain unrelieved by the medications, new numbness or tingling in your legs; 
? If you have a fever of 101.0°F or greater;  
? If you notice excessive swelling, redness, or persistent drainage from the incision or IV site ? Call for any questions or concerns Office [phone] Discharge Orders None [facility] Announcement We are excited to announce that we are making your provider's discharge notes available to you in [place]. You will see these notes when they are completed and signed by the physician that discharged you from your recent hospital stay.   If you have any questions or concerns about any information you see in [facility], please call the agreement24 [email or web address] Department where you were seen or reach out to your Primary Care Provider for more information about your plan of care. Introducing [facility]! Jazlyndavid Gutiérrezmo introduces [employer] patient portal. Now you can access parts of your medical record, email your doctor's office, and request medication refills online. 1. In your internet browser, go to [email or web address] 2. Click on the First Time User? Click Here link in the Sign In box. You will see the New Member Sign Up page. 3. Enter your [employer] Access Code exactly as it appears below. You will not need to use this code after youve completed the sign-up process. If you do not sign up before the expiration date, you must request a new code. · [employer] Access Code: [email or web address] Expires: 7/6/2017  2:01 PM 
 
4. Enter the last four digits of your Social Security Number (xxxx) and Date of Birth (mm/dd/yyyy) as indicated and click Submit. You will be taken to the next sign-up page. 5. Create a [employer] ID. This will be your [employer] login ID and cannot be changed, so think of one that is secure and easy to remember. 6. Create a [employer] password. You can change your password at any time. 7. Enter your Password Reset Question and Answer. This can be used at a later time if you forget your password. 8. Enter your e-mail address. You will receive e-mail notification when new information is available in [address] 9. Click Sign Up. You can now view and download portions of your medical record. 10. Click the Download Summary menu link to download a portable copy of your medical information. If you have questions, please visit the Frequently Asked Questions section of the [employer] website. Remember, [employer] is NOT to be used for urgent needs. For medical emergencies, dial 911. Now available from your iPhone and Android! General Information Please provide this summary of care documentation to your next provider. Patient Signature:  ____________________________________________________________ Date:  ____________________________________________________________  
  
Ernesto Polk Provider Signature:  ____________________________________________________________ Date:  ____________________________________________________________

## 2017-04-27 NOTE — PERIOP NOTES
1837:  Patient received from OR on a bed. Attached to monitor. Elevated HR. Anesthesia aware and at bedside. Attached to oxygen via non-rebreather at 10L. OR report, anesthesia report and MAR acknowledged. Will continue to monitor. 2010:  Dr. Kirstie Jerez at bedside speaking with patient and mother. Patient stable.

## 2017-04-28 VITALS
OXYGEN SATURATION: 97 % | WEIGHT: 210 LBS | HEIGHT: 69 IN | DIASTOLIC BLOOD PRESSURE: 68 MMHG | SYSTOLIC BLOOD PRESSURE: 106 MMHG | TEMPERATURE: 98.1 F | RESPIRATION RATE: 16 BRPM | HEART RATE: 68 BPM | BODY MASS INDEX: 31.1 KG/M2

## 2017-04-28 LAB
ANION GAP BLD CALC-SCNC: 8 MMOL/L (ref 3–18)
BASOPHILS # BLD AUTO: 0 K/UL (ref 0–0.06)
BASOPHILS # BLD: 0 % (ref 0–2)
BUN SERPL-MCNC: 10 MG/DL (ref 7–18)
BUN/CREAT SERPL: 16 (ref 12–20)
CALCIUM SERPL-MCNC: 8.8 MG/DL (ref 8.5–10.1)
CHLORIDE SERPL-SCNC: 106 MMOL/L (ref 100–108)
CO2 SERPL-SCNC: 27 MMOL/L (ref 21–32)
CREAT SERPL-MCNC: 0.62 MG/DL (ref 0.6–1.3)
DIFFERENTIAL METHOD BLD: ABNORMAL
EOSINOPHIL # BLD: 0 K/UL (ref 0–0.4)
EOSINOPHIL NFR BLD: 0 % (ref 0–5)
ERYTHROCYTE [DISTWIDTH] IN BLOOD BY AUTOMATED COUNT: 14 % (ref 11.6–14.5)
GLUCOSE SERPL-MCNC: 132 MG/DL (ref 74–99)
HCT VFR BLD AUTO: 34.1 % (ref 35–45)
HGB BLD-MCNC: 11.1 G/DL (ref 12–16)
LYMPHOCYTES # BLD AUTO: 5 % (ref 21–52)
LYMPHOCYTES # BLD: 0.7 K/UL (ref 0.9–3.6)
MCH RBC QN AUTO: 28.9 PG (ref 24–34)
MCHC RBC AUTO-ENTMCNC: 32.6 G/DL (ref 31–37)
MCV RBC AUTO: 88.8 FL (ref 74–97)
MONOCYTES # BLD: 0.7 K/UL (ref 0.05–1.2)
MONOCYTES NFR BLD AUTO: 5 % (ref 3–10)
NEUTS SEG # BLD: 12.1 K/UL (ref 1.8–8)
NEUTS SEG NFR BLD AUTO: 90 % (ref 40–73)
PLATELET # BLD AUTO: 260 K/UL (ref 135–420)
PMV BLD AUTO: 9.5 FL (ref 9.2–11.8)
POTASSIUM SERPL-SCNC: 4.1 MMOL/L (ref 3.5–5.5)
RBC # BLD AUTO: 3.84 M/UL (ref 4.2–5.3)
SODIUM SERPL-SCNC: 141 MMOL/L (ref 136–145)
WBC # BLD AUTO: 13.5 K/UL (ref 4.6–13.2)

## 2017-04-28 PROCEDURE — 36415 COLL VENOUS BLD VENIPUNCTURE: CPT | Performed by: FAMILY MEDICINE

## 2017-04-28 PROCEDURE — 97162 PT EVAL MOD COMPLEX 30 MIN: CPT

## 2017-04-28 PROCEDURE — 80048 BASIC METABOLIC PNL TOTAL CA: CPT | Performed by: FAMILY MEDICINE

## 2017-04-28 PROCEDURE — 74011250637 HC RX REV CODE- 250/637: Performed by: NEUROLOGICAL SURGERY

## 2017-04-28 PROCEDURE — 85025 COMPLETE CBC W/AUTO DIFF WBC: CPT | Performed by: FAMILY MEDICINE

## 2017-04-28 PROCEDURE — 74011250636 HC RX REV CODE- 250/636: Performed by: NEUROLOGICAL SURGERY

## 2017-04-28 PROCEDURE — 97116 GAIT TRAINING THERAPY: CPT

## 2017-04-28 RX ORDER — OXYCODONE AND ACETAMINOPHEN 5; 325 MG/1; MG/1
1-2 TABLET ORAL
Qty: 30 TAB | Refills: 0 | Status: SHIPPED | OUTPATIENT
Start: 2017-04-28

## 2017-04-28 RX ORDER — METHOCARBAMOL 500 MG/1
500 TABLET, FILM COATED ORAL
Qty: 20 TAB | Refills: 0 | Status: SHIPPED | OUTPATIENT
Start: 2017-04-28

## 2017-04-28 RX ADMIN — ONDANSETRON 4 MG: 2 INJECTION INTRAMUSCULAR; INTRAVENOUS at 00:06

## 2017-04-28 RX ADMIN — DOCUSATE SODIUM 100 MG: 100 CAPSULE, LIQUID FILLED ORAL at 17:34

## 2017-04-28 RX ADMIN — OXYCODONE HYDROCHLORIDE AND ACETAMINOPHEN 2 TABLET: 5; 325 TABLET ORAL at 05:47

## 2017-04-28 RX ADMIN — OXYCODONE HYDROCHLORIDE AND ACETAMINOPHEN 2 TABLET: 5; 325 TABLET ORAL at 12:32

## 2017-04-28 RX ADMIN — MORPHINE SULFATE 4 MG: 10 INJECTION INTRAMUSCULAR; INTRAVENOUS; SUBCUTANEOUS at 00:11

## 2017-04-28 RX ADMIN — OXYCODONE HYDROCHLORIDE AND ACETAMINOPHEN 2 TABLET: 5; 325 TABLET ORAL at 17:34

## 2017-04-28 RX ADMIN — DOCUSATE SODIUM 100 MG: 100 CAPSULE, LIQUID FILLED ORAL at 08:14

## 2017-04-28 RX ADMIN — DIAZEPAM 5 MG: 5 TABLET ORAL at 08:38

## 2017-04-28 RX ADMIN — DEXAMETHASONE SODIUM PHOSPHATE 4 MG: 4 INJECTION, SOLUTION INTRAMUSCULAR; INTRAVENOUS at 14:00

## 2017-04-28 RX ADMIN — CEFAZOLIN SODIUM 2 G: 2 SOLUTION INTRAVENOUS at 13:52

## 2017-04-28 RX ADMIN — DIAZEPAM 5 MG: 5 TABLET ORAL at 15:49

## 2017-04-28 RX ADMIN — CEFAZOLIN SODIUM 2 G: 2 SOLUTION INTRAVENOUS at 05:47

## 2017-04-28 RX ADMIN — HYDROCHLOROTHIAZIDE 12.5 MG: 25 TABLET ORAL at 08:14

## 2017-04-28 RX ADMIN — DEXAMETHASONE SODIUM PHOSPHATE 4 MG: 4 INJECTION, SOLUTION INTRAMUSCULAR; INTRAVENOUS at 06:43

## 2017-04-28 NOTE — PROGRESS NOTES
0800 sitting on the chair, per pt  Her left 3rd, 4th and 5th fingers get numb, was reported last night and was been  Informed to MD by night RN, pt said  Both legs still numb on and off but less after surgery. 0840  Walk in the hallway with P.T, Valium given  Per pt request.    1050  Pt said Valium seems to help her, she said she had talk to Dr Hipolito Norman about her  New tingling of the left  Hands. 1300  Been given walker, up with minimal assist, voiding well, pain under control. 1540  Given Valium, discharge instruction done, verbalized understanding, voiding well. 1735  Up  walking medicated for pain prior to discharge home.

## 2017-04-28 NOTE — PROGRESS NOTES
Care Management Interventions  PCP Verified by CM: Yes  Palliative Care Consult (Criteria: CHF and RRAT>21): No  Reason for No Palliative Care Consult: Patient declined palliative services at this time  Mode of Transport at Discharge: Other (see comment)  Transition of Care Consult (CM Consult):  Other  MyChart Signup: No  Discharge Durable Medical Equipment: Yes  Physical Therapy Consult: Yes  Occupational Therapy Consult: Yes  Speech Therapy Consult: No  Current Support Network: Lives with Spouse  Confirm Follow Up Transport: Family  Plan discussed with Pt/Family/Caregiver: Yes  Discharge Location  Discharge Placement: Home

## 2017-04-28 NOTE — DISCHARGE SUMMARY
Discharge Summary    Patient: Heather Da Silva               Sex: female          DOA: 4/27/2017         YOB: 1965      Age:  46 y.o.        LOS:  LOS: 1 day           Admit Date: 4/27/2017    Discharge Date: 4/28/2017    Consults: Hospitalist    Admission Diagnoses: lumbar stenosis   m48.06  Lumbar stenosis    Discharge Diagnoses:    Problem List as of 4/28/2017  Date Reviewed: 4/27/2017          Codes Class Noted - Resolved    Lumbar stenosis ICD-10-CM: M48.06  ICD-9-CM: 724.02  4/27/2017 - Present              No Known Allergies       Operative Procedure Performed: Procedure(s):  minimally invasive right lumbar 4 - lumbar 5 laminotomies,cyst resection  and TRANSFORAMINAL LUMBAR INTERBODY FUSION ,pedicle screw stabilization   1. Right L4-L5 laminotomies, with removal of extradural cyst for  neurologic decompression. 2. Right L4-L5 transforaminal lumbar interbody fusion. 3. Insertion of Globus Caliber implant. 4. L4-L5 percutaneous Globus Creo pedicle screw stabilization. 5. [place] and cleaning of autologous bone graft. 6. Use of Signafuse allograft. 7. Intraoperative micro-dissection. 8. Minimally invasive approach with intraoperative C-arm fluoroscopy. HPI:  Ms. Heather Da Silva  is a 46 y.o.  female followed by Dr. Isaiah Ocasio as an outpatient for symptomatic back pain and right and a little bit of left radiating pain. 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                      .    Having failed conservative treatment. The patient elected to undergo the above named procedure as intervention. Patient did obtain medical clearance from their primary care provider prior to undergoing elective surgery.     Hospital Course: Ms. Heather Da Silva was admitted to the hospital on 4/27/2017  9:38 AM .  The patient underwent the above named procedure and remained hemodynamically stable during her hospitalization. Received appropriate pre and post operative prophylactic antibiotics as well as maintained on SCDs. Surgical insicion site remained clean dry and intact, flat , no hematoma or s/s of infection. Pain was controlled with prn analgesics. Discharge Medications:     Current Discharge Medication List      START taking these medications    Details   oxyCODONE-acetaminophen (PERCOCET) 5-325 mg per tablet Take 1-2 Tabs by mouth every four (4) hours as needed. Max Daily Amount: 12 Tabs. Indications: Pain  Qty: 30 Tab, Refills: 0      methocarbamol (ROBAXIN) 500 mg tablet Take 1 Tab by mouth four (4) times daily as needed. Indications: MUSCLE SPASM  Qty: 20 Tab, Refills: 0         CONTINUE these medications which have NOT CHANGED    Details   hydroCHLOROthiazide (MICROZIDE) 12.5 mg capsule       estradiol (VIVELLE) 0.075 mg/24 hr       dicloxacillin (DYNAPEN) 500 mg capsule       omeprazole (PRILOSEC) 20 mg capsule Take 20 mg by mouth daily. MULTIVITAMIN,THERAPEUTIC (THERAGRAN PO) Take  by mouth. VITAMIN D2 50,000 unit capsule two (2) times a week. STOP taking these medications       OMEPRAZOLE MAGNESIUM (PRILOSEC OTC PO) Comments:   Reason for Stopping:         naproxen sodium 220 mg cap Comments:   Reason for Stopping:         Omega-3-DHA-EPA-Fish Oil (FISH OIL) 1,000 mg (120 mg-180 mg) cap Comments:   Reason for Stopping:         hydroCHLOROthiazide (HYDRODIURIL) 12.5 mg tablet Comments:   Reason for Stopping:         estradiol (CLIMARA) 0.075 mg/24 hr ptwk Comments:   Reason for Stopping:                     Discharge Instructions:   Lumbar Spine Discharge Instructions      *YOU MUST AVOID SMOKING OR BEING AROUND ANYONE WHO SMOKES. AVOID ALL PRODUCTS THAT CONTAIN NICOTINE. DO NOT TAKE IBUPROFEN, ALEVE, ADVIL, OR OTHER ANTI--INFLAMMATORIES, AS THESE MAY ALTER THE HEALING OF THE FUSION.     ACTIVITIES :  *The first week after surgery   1. You may be up and walking about the house. 2.  Activities around the house, such as washing dishes, fixing light meals, and your own personal care are fine. 3.  Avoid strenuous activities, such as vacuuming, lifting laundry or grocery bags. 4.  Do not lift anything heavier than 1 gallon of milk (or about 5-8 pounds). 5.  Do not bend over to  items from the ground level until 3 months post-op. 6.  Remember the BLTs- No bending, lifting, or twisting. 7. Limit sitting to one hour at a time. 8.  Squat or sit in a chair when removing items from the refrigerator. Put all frequently used items within easy reach. 9.  Carry items close to your body. *Week 2 and beyond  1. You may gradually increase your activities, but still avoid heavy lifting, pushing/pulling. 2.  Walking is the best way to rebuild strength and stamina. Start SLOWLY and gradually increase the distance a little every week. 3.  Walk at a pace that avoids fatigue or severe pain. Do not try to walk several blocks the first day! As you increase the distance, you may feel tired. If so, stop and rest.   4.  You should be able to walk several blocks by your first clinic visit. 5.  Follow-up in the office in 4 weeks. (If you have staples at your incision site, you will need to be seen in 2 weeks.)    BATHING and INCISION CARE:  1. The incision may be tender to touch or feel numb: this is normal. The dressing is similar to steri-strips and will wear off on its own. Edges may be trimmed. 2.  Keep the incision clean and dry. No tub baths. You may shower. Pat the incision dry. 3.  Do not apply any lotions, ointments or oils on the incision. 4.  Use a long handled back brush/sponge to wash feet if you cannot reach them. 5.  Sit to put on pants, socks, and shoes. Do not perform lower body dressing while standing up.     6.  If you notice any excessive swelling, redness, or persistent drainage around the incision, notify the office immediately. DRIVIN. You should not drive until after your follow-up appointment. 2.  You can be in a vehicle for short distances, but if you travel any long distance, please stop about every 30 minutes and walk/stretch. 3.  You should NEVER drive while taking narcotic medication. RETURN TO WORK :  1. The decision to return to work will be determined on an individual basis. 2.  Many people who have a strenuous job (construction, heavy labor, etc) may need to be off work for up to 12 weeks. 3.  If you need a work note, please let us know as soon as possible, and not the same day you are planning to return to work. NUTRITION :  1.  Good nutrition is an essential part of healing. 2.  You should eat a balanced diet each day, including fruits, vegetables, dairy products and protein. 3.  Remember to drink plenty of water. 4.  If you have not had a bowel movement within 3 days of surgery, you will need to use a laxative or suppository that can be obtained over-the-counter at your local pharmacy. MEDICATIONS -  1. You may resume the medications you were taking before surgery, with the general exception of (or DO NOT TAKE) non-steroidal anti-inflammatory medications, such as Motrin, Aleve, Advil Naprosyn, or Ibuprofen. 2.  You will receive a prescription for pain medication at discharge from the hospital. The pain medication works best if taken before the pain becomes severe. 3.  To reduce stomach upset, always take the medication with food. 4.  Begin to wean yourself off the pain medication during the second week after discharge. 5.  If you need a refill, please call the office during working hours at least 2 days before your prescription runs out. Do not wait until your bottle is empty to call for a refill. 6.  DO NOT drive if you are taking narcotic pain medications. HOME HEALTH CARE: (Per individual case if indicated)  1.    A home health care service has been set-up for you to help assist you once you leave the hospital.  2.  They will contact you either before you leave the hospital or within 24 hours once you have been discharged home. 3. A nurse will assist you with your dressing changes and a Physical Therapist with help you with your therapy needs. CALL THE OFFICE:   If you have severe pain unrelieved by the medications, new numbness or tingling in your legs;   If you have a fever of 101.0°F or greater;    If you notice excessive swelling, redness, or persistent drainage from the incision or IV site    Call for any questions or concerns    Office [phone]          Discharge Disposition:  Patient is medically stable for discharge from hospital with above discharge medications, instructions, and follow up care. Discharge assessement and plan made with Dr. Vishal Lopez.         CC:  PCP: Sisi Quinones MD

## 2017-04-28 NOTE — PERIOP NOTES
TRANSFER - OUT REPORT:    Verbal report given to CARMEN Og on Jadyn Andrade  being transferred to [facility] [place] ([phone]) for routine post - op       Report consisted of patients Situation, Background, Assessment and   Recommendations(SBAR). Information from the following report(s) SBAR, Kardex, Procedure Summary, MAR, Recent Results and Med Rec Status was reviewed with the receiving nurse. Lines:   Peripheral IV 04/27/17 Right Antecubital (Active)   Site Assessment Clean, dry, & intact 4/27/2017  7:23 PM   Phlebitis Assessment 0 4/27/2017  7:23 PM   Infiltration Assessment 0 4/27/2017  7:23 PM   Dressing Status Clean, dry, & intact 4/27/2017  7:23 PM   Dressing Type Transparent;Tape 4/27/2017  7:23 PM   Hub Color/Line Status Pink; Infusing 4/27/2017  7:23 PM   Action Taken Open ports on tubing capped 4/27/2017  7:23 PM   Alcohol Cap Used Yes 4/27/2017  7:23 PM        Opportunity for questions and clarification was provided.       Patient transported with:   Registered Nurse

## 2017-04-28 NOTE — PROGRESS NOTES
Problem: Mobility Impaired (Adult and Pediatric)  Goal: *Acute Goals and Plan of Care (Insert Text)  Physical Therapy Goals  Initiated 4/28/2017 and to be accomplished within 7 day(s)  1. Patient will move from supine to sit and sit to supine in bed with modified independence. 2. Patient will transfer from bed to chair and chair to bed with supervision/set-up using the least restrictive device. 3. Patient will perform sit to stand with modified independence. 4. Patient will ambulate with independence for 150 feet with the least restrictive device. 5. Patient will ascend/descend 12 stairs with one handrail(s) with supervision/set-up. Outcome: Progressing Towards Goal  PHYSICAL THERAPY TREATMENT     Patient: Alberto Dillard (13 y.o. female)  Date: 4/28/2017  Diagnosis: lumbar stenosis   m48.06  Lumbar stenosis <principal problem not specified>  Procedure(s) (LRB):  minimally invasive right lumbar 4 - lumbar 5 laminotomies,cyst resection (Right)  and TRANSFORAMINAL LUMBAR INTERBODY FUSION ,pedicle screw stabilization (Right) 1 Day Post-Op  Precautions: Fall, Spinal   Chart, physical therapy assessment, plan of care and goals were reviewed. ASSESSMENT:  Pt sitting in recliner upon entering room. Ambulated with RW 160ft total, reciprocal gt pattern, equal stride, no LOB or path deviations. Fire alarm sounded, pt sat in chair until all clear. Ambulated to stairs, safely negotiated a full flight of stairs holding L hand rail and R wall. Pt performed log roll tech to return to bed with VC only. Education:lumbar precautions, limit sitting to an hour or less at a time  Progression toward goals:  [X]      Improving appropriately and progressing toward goals  [ ]      Improving slowly and progressing toward goals  [ ]      Not making progress toward goals and plan of care will be adjusted       PLAN:  Patient continues to benefit from skilled intervention to address the above impairments.   Continue treatment per established plan of care. Discharge Recommendations:  Home Health  Further Equipment Recommendations for Discharge:  rolling walker delivered and adjusted       SUBJECTIVE:   Patient stated It just hurts to sit down and stand up.       OBJECTIVE DATA SUMMARY:   Critical Behavior:  Neurologic State: Alert  Orientation Level: Oriented X4  Cognition: Follows commands  Safety/Judgement: Fall prevention  Functional Mobility Training:  Bed Mobility:  Rolling: Stand-by asssistance  Sit to Supine: Stand-by asssistance  Transfers:  Sit to Stand: Contact guard assistance  Stand to Sit: Contact guard assistance  Balance:  Sitting: Intact  Sitting - Static: Good (unsupported)  Sitting - Dynamic: Fair (occasional)  Standing: Intact; With support  Standing - Static: Good  Standing - Dynamic : Good  Ambulation/Gait Training:  Distance (ft): 160 Feet (ft)  Assistive Device: Brace/Splint;Gait belt;Walker, rolling  Ambulation - Level of Assistance: Stand-by asssistance;Supervision  Gait Description (WDL): Exceptions to WDL  Gait Abnormalities: Antalgic  Speed/Mavis: Slow  Stairs:  Number of Stairs Trained: 10  Stairs - Level of Assistance: Stand-by asssistance  Rail Use: Both  Pain:  Pre 5  Post 5  Pain Scale 1: Numeric (0 - 10)  Pain Intensity 1: 5  Pain Location 1: Back  Pain Intervention(s) 1: Medication (see MAR)  Activity Tolerance:   Good  Please refer to the flowsheet for vital signs taken during this treatment.   After treatment:   [ ] Patient left in no apparent distress sitting up in chair  [X] Patient left in no apparent distress in bed  [X] Call bell left within reach  [X] Nursing notified  [ ] Caregiver present  [ ] Bed alarm activated      Manolo Dennison PTA   Time Calculation: 24 mins

## 2017-04-28 NOTE — OP NOTES
[facility]    Name:  Salud Zabala  MR#:  [[id number]]  :  [id number]  Account #:  [de-identified]  Date of Adm:  2017  Date of Surgery:  2017      PREOPERATIVE DIAGNOSIS:  Lumbar stenosis, extradural cyst, and  instability. POSTOPERATIVE DIAGNOSIS:  Lumbar stenosis, extradural cyst,  and instability. PROCEDURES PERFORMED  1. Right L4-L5 laminotomies, with removal of extradural cyst for  neurologic decompression. 2. Right L4-L5 transforaminal lumbar interbody fusion. 3. Insertion of Globus Caliber implant. 4. L4-L5 percutaneous Globus Creo pedicle screw stabilization. 5. [place] and cleaning of autologous bone graft. 6. Use of Signafuse allograft. 7. Intraoperative micro-dissection. 8. Minimally invasive approach with intraoperative C-arm fluoroscopy. SURGEON:  Sukhdeep Mcfarland M.D.    ESTIMATED BLOOD LOSS:  Minimal.    SPECIMENS REMOVED: -    ANESTHESIA:  General orotracheal.    COMPLICATIONS:  None. BRIEF CLINICAL NOTE:  This is a 30-year-old lady with progressively  symptomatic back pain and right and a little bit of left radiating pain. She was found to have L4-L5 spondylolisthesis and some instability,  along with a large compressive extradural mass on the right, which  turned out to be a facet cyst.  She requested to undergo the above  procedure. PROCEDURE IN DETAIL:  After informed consent was given, the  patient was brought to the operating room and underwent the induction  of general orotracheal anesthesia without difficulty. Following this, she  was carefully positioned prone upon the operating table with all  pressure points carefully padded. Neuromonitoring electrodes were  placed. Localizing x-rays were done in the AP and lateral planes. We  prepped and draped in the usual sterile fashion. Two small bilateral incisions were outlined, infiltrated with local  anesthetic, and opened.   On the right, the fascia was opened, and  blunt finger dissection was carried down to the facet joints. Using  Jamshidi needles, nerve stimulation, and C-arm fluoroscopy for the  standard landmarks, the needles were tapped into the pedicles. It  turned out that the pedicles on the right were extremely dense and  sclerotic, and ended up bending the needles. The K-wire was passed  without difficulty and tapped, and the screws were placed. These were  Globus Creo MIS Screws. There were no problems on the left side. On the right side, with very hard sclerotic pedicles, the 5/5 cap tip  broke off in the L5 pedicle. I had to therefore used the 4.5 tap for the  screws on the left, and then for the right L4. Again, preparation and  screw placement on the left was without incident. It turned out that  when preparing the right L4 pedicle, the tip of the 4.5 mm tap broke off  inside the pedicle because of the hard nature of the bone. I decided it  was best to go ahead with screw placement, which was done without  difficulty, with good purchase and position. Neuromonitoring was  stable for all four pedicles. There were acceptable thresholds during  tapping and screw placement. The retractor was then mounted on the right. The microscope was  brought into the case. A medial blade was placed. Under the  microscope, L4-L5 laminotomies and bone removal was carried out on  the right. A wide foraminotomy was performed over the L4 and L5  pedicles. There was a large compressive extradural cyst which was  progressively removed, and the nerve root was completely  decompressed. The facet was removed on the right. A transforaminal approach was  taken to the disk. A complete diskectomy was then carried out. The  endplates were prepared, and the disk space was packed tightly with  the autologous drill shavings of laminar bone that was harvested mixed  with Signafuse allograft. The disk space was packed tightly.   Then, a  10 x 26 caliber Globus cage was placed and expanded to 13 mm with  knife elevation of the endplates. The tulips were then placed on the right, and 51 mm rods were placed  bilaterally and locked appropriately. Final x-rays confirmed good  position of the hardware and the implant. The wounds were irrigated. Hemostasis was obtained. We closed in  layers with vancomycin powder, 0-Vicryl, 2-0 Vicryl, and 4-0 Monocryl,  and Prineo on the skin. All sponge and needle counts were correct at  the end of the case. The patient was extubated in the operating room  and taken to recovery in stable condition.         Jian Santiago MD    JK / [address]  D:  04/27/2017   19:07  T:  04/27/2017   22:43  Job #:  [id number]

## 2017-04-28 NOTE — PROGRESS NOTES
Delivered the pt a r/w from the [facility]. Referral and signed delivery ticket faxed to [facility] # [phone] for processing.

## 2017-04-28 NOTE — ANESTHESIA POSTPROCEDURE EVALUATION
Post-Anesthesia Evaluation & Assessment    Visit Vitals    /72    Pulse (!) 103    Temp 36.8 °C (98.3 °F)    Resp 17    Ht 5' 9\" (1.753 m)    Wt 95.3 kg (210 lb)    SpO2 98%    BMI 31.01 kg/m2       Post-operative hydration adequate. Pain score (VAS): 0 Pain Scale 1: Numeric (0 - 10) (04/27/17 1930)  Pain Intensity 1: 4 (04/27/17 1930)   Managed. Mental status & Level of consciousness: returned to baseline    Neurological status: returned to baseline    Pulmonary status: airway patent, oxygen given as needed. Complications related to anesthesia: none    Patient has met all discharge requirements.     Additional comments:        Max Briones MD  April 27, 2017

## 2017-04-28 NOTE — PROGRESS NOTES
Problem: Mobility Impaired (Adult and Pediatric)  Goal: *Acute Goals and Plan of Care (Insert Text)  Physical Therapy Goals  Initiated 4/28/2017 and to be accomplished within 7 day(s)  1. Patient will move from supine to sit and sit to supine in bed with modified independence. 2. Patient will transfer from bed to chair and chair to bed with supervision/set-up using the least restrictive device. 3. Patient will perform sit to stand with modified independence. 4. Patient will ambulate with independence for 150 feet with the least restrictive device. 5. Patient will ascend/descend 12 stairs with one handrail(s) with supervision/set-up. Outcome: Progressing Towards Goal  PHYSICAL THERAPY EVALUATION     Patient: Ivania Viramontes (89 y.o. female)  Date: 4/28/2017  Primary Diagnosis: lumbar stenosis   m48.06  Lumbar stenosis  Procedure(s) (LRB):  minimally invasive right lumbar 4 - lumbar 5 laminotomies,cyst resection (Right)  and TRANSFORAMINAL LUMBAR INTERBODY FUSION ,pedicle screw stabilization (Right) 1 Day Post-Op   Precautions: Fall, Spinal      ASSESSMENT :  Patient seated in recliner with lumbar brace donned appropriately. Lower extremity muscle strength equal and WFL bilaterally. Reports tingling in bilateral feet; lessened since before surgery. Supervision with sit to stand transfers. Amb supervision with ww 440 feet. Prior to surgery patient was independent in ADLs and mobility. Based on the objective data described below, the patient presents consistent with post-surgical diagnosis. She could benefit from skilled therapy to address mobility deficits to aid in return to prior functional level. Patient will benefit from skilled intervention to address the above impairments.   Patients rehabilitation potential is considered to be Good  Factors which may influence rehabilitation potential include:   [ ]         None noted  [ ]         Mental ability/status  [X]         Medical condition  [ ] Home/family situation and support systems  [ ]         Safety awareness  [X]         Pain tolerance/management  [ ]         Other:      Recommendations for nursing: supervision with transfers and gait  Written on communication board: yes  Verbally communicated to: CARMEN Barrientos Million : Patient will receive physical therapy treatment 1-2x/day for remainder of inpatient stay or until goals met to aid in return to prior functional level. Recommendations and Planned Interventions:  [X]           Bed Mobility Training             [X]    Neuromuscular Re-Education  [X]           Transfer Training                   [ ]    Orthotic/Prosthetic Training  [X]           Gait Training                          [ ]    Modalities  [X]           Therapeutic Exercises          [ ]    Edema Management/Control  [X]           Therapeutic Activities            [X]    Patient and Family Training/Education  [ ]           Other (comment):     Frequency/Duration: Patient will be followed by physical therapy 1-2 times per day/4-7 days per week to address goals.   Discharge Recommendations: None  Further Equipment Recommendations for Discharge: N/A       SUBJECTIVE:   Patient stated I'm pretty much done with breakfast. LUE IV, Lumbar brace donned      OBJECTIVE DATA SUMMARY:       Past Medical History:   Diagnosis Date    Degenerative joint disease (DJD) of hip      GERD (gastroesophageal reflux disease)      HTN (hypertension)      Hypertension      Lumbar back pain      Lumbar radiculopathy      Mixed hyperlipidemia      Status post trachelectomy      Vitamin D deficiency       Past Surgical History:   Procedure Laterality Date    ABDOMEN SURGERY PROC UNLISTED         PANCREASE SX    HX APPENDECTOMY        HX HIP REPLACEMENT        HX HIP REPLACEMENT Bilateral      HX HYSTERECTOMY        HX LUMBAR LAMINECTOMY        HX SALPINGO-OOPHORECTOMY        HX TONSILLECTOMY        HX TUBAL LIGATION        REPAIR ENTEROCELE,ABD APPRCH         Barriers to Learning/Limitations: None  Compensate with: visual, verbal, tactile, kinesthetic cues/model     G CODE:Mobility  Current  CJ= 20-39%   Goal  CI= 1-19%. The severity rating is based on the Other [employer] Balance Scale 3+/5     [employer] Balance Scale 3+/5  0: Pt performs 25% or less of standing activity (Max assist) CN, 100% impaired. 1: Pt supports self with upper extremities but requires therapist assistance. Pt performs 25-50% of effort (Mod assist) CM, 80% to <100% impaired. 1+: Pt supports self with upper extremities but requires therapist assistance. Pt performs >50% effort. (Min assist). CL, 60% to <80% impaired. 2: Pt supports self independently with both upper extremities (walker, crutches, parallel bars). CL, 60% to <80% impaired. 2+: Pt support self independently with 1 upper extremity (cane, crutch, 1 parallel bar). CK, 40% to <60% impaired. 3: Pt stands without upper extremity support for up to 30 seconds. CK, 40% to <60% impaired. 3+: Pt stands without upper extremity support for 30 seconds or greater. CJ, 20% to <40% impaired. 4: Pt independently moves and returns center of gravity 1-2 inches in one plane. CJ, 20% to <40% impaired. 4+: Pt independently moves and returns center of gravity 1-2 inches in multiple planes. CI, 1% to <20% impaired. 5: Pt independently moves and returns center of gravity in all planes greater than 2 inches. CH, 0% impaired.      Eval Complexity: History: MEDIUM  Complexity : 1-2 comorbidities / personal factors will impact the outcome/ POC Exam:MEDIUM Complexity : 3 Standardized tests and measures addressing body structure, function, activity limitation and / or participation in recreation  Presentation: MEDIUM Complexity : Evolving with changing characteristics  Clinical Decision Making:Medium Complexity [facility] Standing Balance Scale 3+/5 Overall Complexity:MEDIUM Prior Level of Function/Home Situation:   Home Situation  Home Environment: Private residence  # Steps to Enter: 0  One/Two Story Residence: Two story  # of Interior Steps: 12  Interior Rails: Left  Living Alone: No  Support Systems: Family member(s)  Patient Expects to be Discharged to[de-identified] Private residence  Current DME Used/Available at Home: None  Critical Behavior:  Neurologic State: Alert  Orientation Level: Oriented X4  Cognition: Follows commands  Safety/Judgement: Fall prevention  Psychosocial  Patient Behaviors: Cooperative  Purposeful Interaction: Yes  Strength:    Strength: Within functional limits  Tone & Sensation:   Tone: Normal  Sensation: Intact  Range Of Motion:  AROM: Within functional limits  Functional Mobility:  Bed Mobility:  Transfers:  Sit to Stand: Supervision  Stand to Sit: Supervision  Balance:   Sitting: Intact  Standing: Impaired  Standing - Static: Good  Standing - Dynamic : Fair  Ambulation/Gait Training:  Distance (ft): 440 Feet (ft)  Assistive Device: Walker, rolling  Ambulation - Level of Assistance: Supervision  Gait Description (WDL): Exceptions to WDL  Speed/Mavis: Slow  Therapeutic Exercises:   n/a  Pain: 4/10  Pre treatment pain level: 4  Post treatment pain level: 4  Pain Scale 1: Numeric (0 - 10)  Pain Intensity 1: 4  Pain Location 1: Back  Pain Intervention(s) 1: Medication (see MAR)  Activity Tolerance:   Good     Please refer to the flowsheet for vital signs taken during this treatment.   HR: 114; /74; O2 saturation 92% room air (pre-PT session)  HR: 96 O2 Saturation 94% (post-PT session)     After treatment:   [X]         Patient left in no apparent distress sitting up in chair  [ ]         Patient left in no apparent distress in bed  [X]         Call bell left within reach  [X]         Nursing notified  [ ]         Caregiver present  [ ]         Bed alarm activated      COMMUNICATION/EDUCATION:   [X]         Fall prevention education was provided and the patient/caregiver indicated understanding. [X]         Patient/family have participated as able in goal setting and plan of care. [X]         Patient/family agree to work toward stated goals and plan of care. [ ]         Patient understands intent and goals of therapy, but is neutral about his/her participation. [ ]         Patient is unable to participate in goal setting and plan of care.      Thank you for this referral.  Romel Ponce, PT   Time Calculation: 23 mins

## 2017-04-28 NOTE — H&P
History and Physical    Patient: Linda West               Sex: female          DOA: 4/27/2017       YOB: 1965      Age:  46 y.o.        LOS:  LOS: 0 days        No chief complaint on file. HPI:     Linda West is a 46 y.o. female with hx hypertension , on prophylaxis antibiotic , lumbar stenosis , gerd who is s/p minimal invasive L4 - L5 Laminectomies and trans foraminal lumbar interbody fusion today with Dr Louisa Winter. Patient was operated for lumbar stenosis and Right L4/L5 Facet cyst which was resected. She denies any further complaint except mild pain from surgical site. Negative for chest pain , sob, fever, cough. Past Medical History:   Diagnosis Date    Degenerative joint disease (DJD) of hip     GERD (gastroesophageal reflux disease)     HTN (hypertension)     Hypertension     Lumbar back pain     Lumbar radiculopathy     Mixed hyperlipidemia     Status post trachelectomy     Vitamin D deficiency    . Past Surgical History:   Procedure Laterality Date    ABDOMEN SURGERY PROC UNLISTED      PANCREASE SX    HX APPENDECTOMY      HX HIP REPLACEMENT      HX HIP REPLACEMENT Bilateral     HX HYSTERECTOMY      HX LUMBAR LAMINECTOMY      HX SALPINGO-OOPHORECTOMY      HX TONSILLECTOMY      HX TUBAL LIGATION      REPAIR ENTEROCELE, [place]         No current facility-administered medications on file prior to encounter. Current Outpatient Prescriptions on File Prior to Encounter   Medication Sig Dispense Refill    hydroCHLOROthiazide (MICROZIDE) 12.5 mg capsule       dicloxacillin (DYNAPEN) 500 mg capsule       naproxen sodium 220 mg cap Take  by mouth.  omeprazole (PRILOSEC) 20 mg capsule Take 20 mg by mouth daily.  Omega-3-DHA-EPA-Fish Oil (FISH OIL) 1,000 mg (120 mg-180 mg) cap Take  by mouth.  estradiol (VIVELLE) 0.075 mg/24 hr       VITAMIN D2 50,000 unit capsule       MULTIVITAMIN,THERAPEUTIC (THERAGRAN PO) Take  by mouth. Social History     Social History    Marital status:      Spouse name: N/A    Number of children: N/A    Years of education: N/A     Occupational History    Not on file. Social History Main Topics    Smoking status: Never Smoker    Smokeless tobacco: Not on file    Alcohol use Yes      Comment: occasionally    Drug use: Not on file    Sexual activity: Not on file     Other Topics Concern    Not on file     Social History Narrative    ** Merged History Encounter **            Prior to Admission Medications   Prescriptions Last Dose Informant Patient Reported? Taking? MULTIVITAMIN,THERAPEUTIC (THERAGRAN PO) Unknown at Unknown time  Yes No   Sig: Take  by mouth. OMEPRAZOLE MAGNESIUM (PRILOSEC OTC PO)   Yes Yes   Sig: Take  by mouth. Omega-3-DHA-EPA-Fish Oil (FISH OIL) 1,000 mg (120 mg-180 mg) cap 3/27/2017 at Unknown time  Yes Yes   Sig: Take  by mouth. VITAMIN D2 50,000 unit capsule 2017  Yes No   dicloxacillin (DYNAPEN) 500 mg capsule 2017 at Unknown time  Yes Yes   estradiol (CLIMARA) 0.075 mg/24 hr ptwk 2017 at Unknown time  Yes Yes   Si Patch by TransDERmal route two (2) days a week. estradiol (VIVELLE) 0.075 mg/24 hr   Yes No   hydroCHLOROthiazide (HYDRODIURIL) 12.5 mg tablet 2017 at Unknown time  Yes Yes   Sig: Take 12.5 mg by mouth daily. hydroCHLOROthiazide (MICROZIDE) 12.5 mg capsule 2017 at Unknown time  Yes Yes   naproxen sodium 220 mg cap 2017 at Unknown time  Yes Yes   Sig: Take  by mouth. omeprazole (PRILOSEC) 20 mg capsule 2017 at Unknown time  Yes Yes   Sig: Take 20 mg by mouth daily. Facility-Administered Medications: None       Family History   Problem Relation Age of Onset    Hypertension Father     Heart Disease Father        Review of Systems   Constitutional: Positive for chills. Negative for diaphoresis, fever and malaise/fatigue. HENT: Negative. Eyes: Negative. Respiratory: Negative. Cardiovascular: Negative. Gastrointestinal: Negative. Genitourinary: Negative. Musculoskeletal: Positive for back pain. Skin: Negative. Neurological: Negative. Endo/Heme/Allergies: Negative. Psychiatric/Behavioral: Negative. Physical Exam:       Visit Vitals    /72    Pulse (!) 105    Temp 98.3 °F (36.8 °C)    Resp 17    Ht 5' 9\" (1.753 m)    Wt 95.3 kg (210 lb)    SpO2 98%    BMI 31.01 kg/m2       Physical Exam   Constitutional: She is oriented to person, place, and time. She appears well-developed and well-nourished. No distress. HENT:   Head: Normocephalic and atraumatic. Mouth/Throat: No oropharyngeal exudate. Eyes: Conjunctivae and EOM are normal. Pupils are equal, round, and reactive to light. No scleral icterus. Neck: Neck supple. Cardiovascular: Normal rate, regular rhythm and normal heart sounds. No murmur heard. Pulmonary/Chest: Effort normal and breath sounds normal. No respiratory distress. She has no wheezes. She has no rales. Abdominal: Soft. Bowel sounds are normal. She exhibits no distension. There is no guarding. Musculoskeletal: She exhibits no edema. Neurological: She is alert and oriented to person, place, and time. Skin: Skin is warm. No rash noted. She is not diaphoretic. No erythema. No pallor. Psychiatric: She has a normal mood and affect. Ancillary Studies: All lab and imaging reviewed for the past 24 hours. No results found for this or any previous visit (from the past 24 hour(s)).     Assessment/Plan     Active Problems:    Lumbar stenosis (4/27/2017)      HTN   GERD  S/P HIP SURGERY       PLAN:    Lumbar stenosis   - s/p L4-L5 Laminectomies with trans foraminal lumabar interbody fusion and facet resection  - pain control as needed  - Ancef  - following Neurosurgery will follow their recommendations     HTN  - On HCTZ    GERD  - Protonix     S/P Hip Surgery   - On Life time Prophylaxis abx - Doxycycline     DVT Prophylaxis - SCD's.      GI Prophylaxis - Protonix    Routine Labs AM     Full code     Cuco Julio MD  4/27/2017  8:12 PM

## 2017-04-28 NOTE — PROGRESS NOTES
Patient has designated her  to participate in his/her discharge plan and to receive any needed information.      Name:   Maryam Sanders  spouse   [phone]   [address] [place] [address] , [address] [place] [address] Road   April 28, 2017        Address:  Phone number:

## 2017-04-28 NOTE — DISCHARGE INSTRUCTIONS
Office:  [phone]     Lumbar Spine Discharge Instructions      *YOU MUST AVOID SMOKING OR BEING AROUND ANYONE WHO SMOKES. AVOID ALL PRODUCTS THAT CONTAIN NICOTINE. DO NOT TAKE IBUPROFEN, ALEVE, ADVIL, OR OTHER ANTI--INFLAMMATORIES, AS THESE MAY ALTER THE HEALING OF THE FUSION. ACTIVITIES :  *The first week after surgery   1. You may be up and walking about the house. 2.  Activities around the house, such as washing dishes, fixing light meals, and your own personal care are fine. 3.  Avoid strenuous activities, such as vacuuming, lifting laundry or grocery bags. 4.  Do not lift anything heavier than 1 gallon of milk (or about 5-8 pounds). 5.  Do not bend over to  items from the ground level until 3 months post-op. 6.  Remember the BLTs- No bending, lifting, or twisting. 7. Limit sitting to one hour at a time. 8.  Squat or sit in a chair when removing items from the refrigerator. Put all frequently used items within easy reach. 9.  Carry items close to your body. *Week 2 and beyond  1. You may gradually increase your activities, but still avoid heavy lifting, pushing/pulling. 2.  Walking is the best way to rebuild strength and stamina. Start SLOWLY and gradually increase the distance a little every week. 3.  Walk at a pace that avoids fatigue or severe pain. Do not try to walk several blocks the first day! As you increase the distance, you may feel tired. If so, stop and rest.   4.  You should be able to walk several blocks by your first clinic visit. 5.  Follow-up in the office in 4 weeks. (If you have staples at your incision site, you will need to be seen in 2 weeks.)    BATHING and INCISION CARE:  1. The incision may be tender to touch or feel numb: this is normal. The dressing is similar to steri-strips and will wear off on its own. Edges may be trimmed. 2.  Keep the incision clean and dry. No tub baths. You may shower. Pat the incision dry.    3.  Do not apply any lotions, ointments or oils on the incision. 4.  Use a long handled back brush/sponge to wash feet if you cannot reach them. 5.  Sit to put on pants, socks, and shoes. Do not perform lower body dressing while standing up. 6.  If you notice any excessive swelling, redness, or persistent drainage around the incision, notify the office immediately. DRIVIN. You should not drive until after your follow-up appointment. 2.  You can be in a vehicle for short distances, but if you travel any long distance, please stop about every 30 minutes and walk/stretch. 3.  You should NEVER drive while taking narcotic medication. RETURN TO WORK :  1. The decision to return to work will be determined on an individual basis. 2.  Many people who have a strenuous job (construction, heavy labor, etc) may need to be off work for up to 12 weeks. 3.  If you need a work note, please let us know as soon as possible, and not the same day you are planning to return to work. NUTRITION :  1.  Good nutrition is an essential part of healing. 2.  You should eat a balanced diet each day, including fruits, vegetables, dairy products and protein. 3.  Remember to drink plenty of water. 4.  If you have not had a bowel movement within 3 days of surgery, you will need to use a laxative or suppository that can be obtained over-the-counter at your local pharmacy. MEDICATIONS -  1. You may resume the medications you were taking before surgery, with the general exception of (or DO NOT TAKE) non-steroidal anti-inflammatory medications, such as Motrin, Aleve, Advil Naprosyn, or Ibuprofen. 2.  You will receive a prescription for pain medication at discharge from the hospital. The pain medication works best if taken before the pain becomes severe. 3.  To reduce stomach upset, always take the medication with food. 4.  Begin to wean yourself off the pain medication during the second week after discharge.    5.  If you need a refill, please call the office during working hours at least 2 days before your prescription runs out. Do not wait until your bottle is empty to call for a refill. 6.  DO NOT drive if you are taking narcotic pain medications. HOME HEALTH CARE: (Per individual case if indicated)  1. A home health care service has been set-up for you to help assist you once you leave the hospital.  2.  They will contact you either before you leave the hospital or within 24 hours once you have been discharged home. 3. A nurse will assist you with your dressing changes and a Physical Therapist with help you with your therapy needs.     CALL THE OFFICE:   If you have severe pain unrelieved by the medications, new numbness or tingling in your legs;   If you have a fever of 101.0°F or greater;    If you notice excessive swelling, redness, or persistent drainage from the incision or IV site    Call for any questions or concerns    Office [phone]

## 2017-04-28 NOTE — ROUTINE PROCESS
The patient arrived to the floor in stable condition. The patient vs are stable. The patient is in stable condition. Dressing clean, dry and intact. Pedal pulses intact. Radial pulses intact. The patient stated she has tingling in her feet that was there previously, and numbness across her left foot that was there previously. The patient also stated that she has new tingling in her left hand. I paged Dr. Jennie [place] regarding this around 2107 no call back. The patient can wiggle her toes and her fingers. 2135- I spoke with Dr. Jennie [place] regarding tingling in the patient toes as well as the new tingling in her left hand. No new orders. 2350-The patient stood up on the side of the bed. 0012-The patient received pain medication. The patient stated that her tingling and numbness in left hand has improved. The patient also stated her tingling and numbness in her feet has improved as well.    0702-The patient ambulated to the chair with assistance, the patient had on her back brace. The patient was a little weak while ambulating to the chair.

## 2017-04-28 NOTE — PROGRESS NOTES
[facility]   Discharge Planning/ Assessment    Reasons for Intervention: Interviewed patient, she agrees to share her discharge information with her , see below. She was independent prior to admission and see Dr Cyrus Schuster for her primary care needs. Her discharge plan is to return home.      High Risk Criteria  [x] Yes  []No   Physician Referral  [] Yes  [x]No        Date    Nursing Referral  [] Yes  [x]No        Date    Patient/Family Request  [] Yes  [x]No        Date       Resources:    Medicare  [] Yes  [x]No   Medicaid  [] Yes  [x]No   No Resources  [] Yes  [x]No   Private Insurance  [x] Yes  []No    Name/Phone Number    Other  [] Yes  [x]No        (i.e. Workman's Comp)         Prior Services:    Prior Services  [] Yes  [x]No   Home Health  [] Yes  [x]No   [address] [place]  [] Yes  [x]No        [address] [facility]  [] Yes  [x]No       Meals on Wheels  [] Yes  [x]No   Office on Aging  [] Yes  [x]No   Transportation Services  [] Yes  [x]No   Nursing Home  [] Yes  [x]No        Nursing Home Name    [address]  [] Yes  [x]No        [address] Name    Other       Information Source:      Information obtained from  [x] Patient  [] Parent   [] [address]   [] Child  [] Spouse   [] Significant Other/Partner   [] Friend      [] EMS    [] Nursing Home Chart          [x] Other:chart   Chart Review  [x] Yes  []No     Family/Support System:    Patient lives with  [] Alone    [x] Spouse   [] Significant Other  [] Children  [] Caretaker   [] Parent  [] Sibling     [] Other       Other Support System:    Is the patient responsible for care of others  [] Yes  [x]No   Information of person caring for patient on  discharge self   Managers financial affairs independently  [x] Yes  []No   If no, explain:      Status Prior to Admission:    Mental Status  [x] Awake  [x] Alert  [x] Oriented  [] Quiet/Calm [] Lethargic/Sedated   [] Disoriented  [] Restless/Anxious  [] Combative   Personal Care  [] Dependent  [x] Independent Personal Care  [] Requires Assistance   Meal Preparation Ability  [x] Independent   [] Standby Assistance   [] Minimal Assistance   [] Moderate Assistance  [] Maximum Assistance     [] Total Assistance   Chores  [x] Independent with Chores   [] N/A Nursing Home Resident   [] Requires Assistance   Bowel/Bladder  [x] Continent  [] Catheter  [] Incontinent  [] Ostomy Self-Care    [] Urine Diversion Self-Care  [] Maximum Assistance     [] Total Assistance   Number of Persons needed for assistance    DME at home  [] Pernell Claire, Baby Pare  [] Pernell Claire, Straight   [] Commode    [] Bathroom/Grab Bars  [] Hospital Bed  [] Nebulizer  [] Oxygen           [] Raised Toilet Seat  [] Shower Chair  [] Side Rails for Bed   [] Tub Transfer Bench   [] Ty Toro  [] Moni Parnell, Standard      [] Other:   Vendor      Treatment Presently Receiving:    Current Treatments  [] Chemotherapy  [] Dialysis  [] Insulin  [] IVAB [x] IVF   [] O2  [] PCA   [] PT   [] RT   [] Tube Feedings   [] Wound Care     Psychosocial Evaluation:    Verbalized Knowledge of Disease Process  [x] Patient  [x]Family   Coping with Disease Process  [x] Patient  [x]Family   Requires Further Counseling Coping with Disease Process  [] Patient  []Family     Identified Projected Needs:    Home Health Aid  [] Yes  [x]No   Transportation  [] Yes  [x]No   Education  [] Yes  [x]No        Specific Education     Financial Counseling  [] Yes  [x]No   Inability to Care for Self/Will Require 24 hour care  [] Yes  [x]No   Pain Management  [] Yes  [x]No   Home Infusion Therapy  [] Yes  [x]No   Oxygen Therapy  [] Yes  [x]No   DME  [] Yes  [x]No   Long Term Care Placement  [] Yes  [x]No   Rehab  [] Yes  [x]No   Physical Therapy  [] Yes  [x]No   Needs Anticipated At This Time  [] Yes  [x]No     Intra-Hospital Referral:    [address] [facility]  [] Yes  [x]No     [] Yes  [x]No   Patient Representative  [] Yes  [x]No Staff for Teaching Needs  [] Yes  [x]No   Specialty Teaching Needs     Diabetic Educator  [] Yes  [x]No   Referral for Diabetic Educator Needed  [] Yes  [x]No  If Yes, place order for Nutritionist or Diabetic Consult     Tentative Discharge Plan:    Home with No Services  [x] Yes  []No   Home with [address]  [] Yes  [x]No        If Yes, specify type    [address]  [] Yes  [x]No        If Yes, specify type    Meals on Wheels  [] Yes  [x]No   Office of Aging  [] Yes  [x]No   NHP  [] Yes  [x]No   Return to the Nursing Home  [] Yes  [x]No   Rehab Therapy  [] Yes  [x]No   Acute Rehab  [] Yes  [x]No   Subacute Rehab  [] Yes  [x]No   Private Care  [] Yes  [x]No   Substance Abuse Referral  [] Yes  [x]No   Transportation  [] Yes  [x]No   Chore Service  [] Yes  [x]No   Inpatient Hospice  [] Yes  [x]No   OP RT  [] Yes  [x] No   OP Hemo  [] Yes  [x] No   OP PT  [] Yes  [x]No   Support Group  [] Yes  [x]No   Reach to Recovery  [] Yes  [x]No   OP Oncology Clinic  [] Yes  [x]No   Clinic Appointment  [] Yes  [x]No   DME  [] Yes  [x]No   Comments    Name of D/C Planner or  Given to Patient or Family Layton Vora RN   Phone Number [phone] [id number]   Date April 28, 2017   Time 746 am   If you are discharged home, whom do you designate to participate in your discharge plan and receive any information needed?      Enter name of Ruby W Edmundo Leal  spouse        Phone # of designee         Address of Mikel Cruzaurelianos [address] [facility] , [address]        Updated April 28, 2017        Patient refused to designate any           individual

## 2017-04-28 NOTE — ROUTINE PROCESS
Bedside and Verbal shift change report given to Tray Rodriguez RN (oncoming nurse) by Muriel Sutton RN (offgoing nurse). Report included the following information SBAR, Kardex and MAR.

## 2017-04-28 NOTE — PROGRESS NOTES
Progress Note      Patient: Kobe Healy               Sex: female          DOA: 4/27/2017       YOB: 1965      Age:  46 y.o.        LOS:  LOS: 1 day             CHIEF COMPLAINT:  Lumbar stenosis    Subjective:     Patient is awake and alert  Able to mobilize    Objective:      Visit Vitals    /68 (BP 1 Location: Left arm, BP Patient Position: At rest;Sitting)    Pulse 68    Temp 98.1 °F (36.7 °C)    Resp 16    Ht 5' 9\" (1.753 m)    Wt 95.3 kg (210 lb)    SpO2 97%    BMI 31.01 kg/m2       Physical Exam:  Gen:  No distress today, no complaint. Lungs:  Clear bilaterally, no wheezes or rales. Heart:  Regular rate and rhythm. No murmur.           Lab/Data Reviewed:  BMP:   Lab Results   Component Value Date/Time     04/28/2017 06:10 AM    K 4.1 04/28/2017 06:10 AM     04/28/2017 06:10 AM    CO2 27 04/28/2017 06:10 AM    AGAP 8 04/28/2017 06:10 AM     (H) 04/28/2017 06:10 AM    BUN 10 04/28/2017 06:10 AM    CREA 0.62 04/28/2017 06:10 AM    GFRAA >60 04/28/2017 06:10 AM    GFRNA >60 04/28/2017 06:10 AM     CBC:   Lab Results   Component Value Date/Time    WBC 13.5 (H) 04/28/2017 06:10 AM    HGB 11.1 (L) 04/28/2017 06:10 AM    HCT 34.1 (L) 04/28/2017 06:10 AM     04/28/2017 06:10 AM           Assessment/Plan     Active Problems:    Lumbar stenosis (4/27/2017)        Plan:  Continue with mobilization  BP control  Pain control  Discussed with patient and sister at the bedside

## 2017-04-28 NOTE — PROGRESS NOTES
met with patient completed the initial Spiritual Assessment of the patient, and offered Pastoral Care, see flow sheets for interventions.  extended the assurance of prayer and spiritual care materials. Patient does not have any Synagogue/cultural needs that will affect patients preferences in health care. The patient was informed that chaplains will continue to follow and will provide pastoral care on an as needed/requested basis.       Maye Patricio, MPH, [address] [phone]

## 2017-04-28 NOTE — PROGRESS NOTES
Progress Note      Patient: Amanda Carrasco               Sex: female          DOA: 4/27/2017         YOB: 1965      Age:  46 y.o.        LOS:  LOS: 1 day                  Procedure(s):  minimally invasive right lumbar 4 - lumbar 5 laminotomies,cyst resection  and TRANSFORAMINAL LUMBAR INTERBODY FUSION ,pedicle screw stabilization   1. Right L4-L5 laminotomies, with removal of extradural cyst for  neurologic decompression. 2. Right L4-L5 transforaminal lumbar interbody fusion. 3. Insertion of Globus Caliber implant. 4. L4-L5 percutaneous Globus Creo pedicle screw stabilization. 5. [place] and cleaning of autologous bone graft. 6. Use of Signafuse allograft. 7. Intraoperative micro-dissection. 8. Minimally invasive approach with intraoperative C-arm fluoroscopy. POD# 1  SUBJECTIVE:  Doing well. Complaints of surgical site pain when she initially gets up but goes away with PRN analgesics. Denies tingling, and burning. States she has numbness to bilateral lateral feet and to left hand.       OBJECTIVE:  Patient Vitals for the past 24 hrs:   Temp Pulse Resp BP SpO2   04/28/17 1125 98.1 °F (36.7 °C) 68 16 106/68 97 %   04/28/17 0816 - (!) 114 - 117/74 92 %   04/28/17 0657 98.1 °F (36.7 °C) 64 16 105/71 95 %   04/28/17 0537 97.9 °F (36.6 °C) 73 18 127/75 96 %   04/28/17 0015 97.6 °F (36.4 °C) 74 18 102/51 94 %   04/27/17 2048 98 °F (36.7 °C) 80 18 134/74 94 %   04/27/17 2022 - 78 10 110/71 97 %   04/27/17 2014 - (!) 103 - - -   04/27/17 1957 - (!) 105 - - -   04/27/17 1952 - (!) 101 17 117/72 98 %   04/27/17 1938 - (!) 109 - 110/58 -   04/27/17 1922 - (!) 101 10 110/58 92 %   04/27/17 1919 - (!) 112 - 110/60 -   04/27/17 1908 - (!) 112 - 110/60 -   04/27/17 1907 - (!) 109 12 110/60 93 %   04/27/17 1859 - (!) 125 - 124/57 -   04/27/17 1852 - (!) 120 10 124/57 99 %   04/27/17 1848 98.3 °F (36.8 °C) (!) 124 16 122/63 100 %   04/27/17 1838 - (!) 123 15 - 99 %   04/27/17 1837 - - - 122/63 96 %       Intake and Output    Intake/Output Summary (Last 24 hours) at 04/28/17 1208  Last data filed at 04/28/17 0820   Gross per 24 hour   Intake             3990 ml   Output             2825 ml   Net             1165 ml         Data    Recent Results (from the past 24 hour(s))   CBC WITH AUTOMATED DIFF    Collection Time: 04/28/17  6:10 AM   Result Value Ref Range    WBC 13.5 (H) 4.6 - 13.2 K/uL    RBC 3.84 (L) 4.20 - 5.30 M/uL    HGB 11.1 (L) 12.0 - 16.0 g/dL    HCT 34.1 (L) 35.0 - 45.0 %    MCV 88.8 74.0 - 97.0 FL    MCH 28.9 24.0 - 34.0 PG    MCHC 32.6 31.0 - 37.0 g/dL    RDW 14.0 11.6 - 14.5 %    PLATELET 615 378 - 772 K/uL    MPV 9.5 9.2 - 11.8 FL    NEUTROPHILS 90 (H) 40 - 73 %    LYMPHOCYTES 5 (L) 21 - 52 %    MONOCYTES 5 3 - 10 %    EOSINOPHILS 0 0 - 5 %    BASOPHILS 0 0 - 2 %    ABS. NEUTROPHILS 12.1 (H) 1.8 - 8.0 K/UL    ABS. LYMPHOCYTES 0.7 (L) 0.9 - 3.6 K/UL    ABS. MONOCYTES 0.7 0.05 - 1.2 K/UL    ABS. EOSINOPHILS 0.0 0.0 - 0.4 K/UL    ABS.  BASOPHILS 0.0 0.0 - 0.06 K/UL    DF AUTOMATED     METABOLIC PANEL, BASIC    Collection Time: 04/28/17  6:10 AM   Result Value Ref Range    Sodium 141 136 - 145 mmol/L    Potassium 4.1 3.5 - 5.5 mmol/L    Chloride 106 100 - 108 mmol/L    CO2 27 21 - 32 mmol/L    Anion gap 8 3.0 - 18 mmol/L    Glucose 132 (H) 74 - 99 mg/dL    BUN 10 7.0 - 18 MG/DL    Creatinine 0.62 0.6 - 1.3 MG/DL    BUN/Creatinine ratio 16 12 - 20      GFR est AA >60 >60 ml/min/1.73m2    GFR est non-AA >60 >60 ml/min/1.73m2    Calcium 8.8 8.5 - 10.1 MG/DL        Current Facility-Administered Medications   Medication Dose Route Frequency    hydroCHLOROthiazide (HYDRODIURIL) tablet 12.5 mg  12.5 mg Oral DAILY    sodium chloride (NS) flush 5-10 mL  5-10 mL IntraVENous Q8H    sodium chloride (NS) flush 5-10 mL  5-10 mL IntraVENous PRN    dextrose 5% - 0.45% NaCl with KCl 20 mEq/L infusion  75 mL/hr IntraVENous CONTINUOUS    acetaminophen (TYLENOL) tablet 650 mg  650 mg Oral Q4H PRN    oxyCODONE-acetaminophen (PERCOCET) 5-325 mg per tablet 1-2 Tab  1-2 Tab Oral Q4H PRN    morphine injection 4-6 mg  4-6 mg IntraVENous Q3H PRN    ondansetron (ZOFRAN) injection 4 mg  4 mg IntraVENous Q4H PRN    docusate sodium (COLACE) capsule 100 mg  100 mg Oral BID    ceFAZolin (ANCEF) 2g IVPB in 50 mL D5W  2 g IntraVENous Q8H    dexamethasone (DECADRON) 4 mg/mL injection 4 mg  4 mg IntraVENous Q8H    diazePAM (VALIUM) tablet 5 mg  5 mg Oral Q6H PRN    pantoprazole (PROTONIX) 40 mg in sodium chloride 0.9 % 10 mL injection  40 mg IntraVENous DAILY       Physical Exam  General:  Neurologic: Alert and oriented X 3  Sensory: decreased sharp sensation to bilateral lateral feet from little toe to third toe. Decreased sharp sensation to left lateral hand also from little finger to middle finger. Motor: 5/5 BLE  [place] dressing to lumbar spine intact. Wound edges well approximated, no redness, no swelling, no drainage, no ecchymosis. Eyes: conjunctivae/corneas clear. PERRL, EOM's intact. Lungs: clear to auscultation bilaterally  Heart: regular rate and rhythm, S1, S2 normal, no murmur, click, rub or gallop  Abdomen: soft, non-tender. Bowel sounds normal. No masses,  no organomegaly  Extremities: extremities normal, atraumatic, no cyanosis or edema  Pulses: 2+ and symmetric          Assessment/Plan    46y.o. year old female who is hospital day 1 for <principal problem not specified>.     1.)  Neurologic:  Doing well. Pain controlled by PRN analgesics and spasms controlled by antispasmodics. Can be DCD this evening.           Assessment and plan made with Dr. Keyla Russell, NP  4/28/2017  12:08 PM

## (undated) DEVICE — STERILE LATEX POWDER-FREE SURGICAL GLOVES: Brand: PROTEXIS

## (undated) DEVICE — DRAPE C-ARMOUR C-ARM KIT --

## (undated) DEVICE — BIPOLAR FORCEPS CORD,BANANA LEADS: Brand: VALLEYLAB

## (undated) DEVICE — SOL IRR NACL 0.9% 500ML POUR --

## (undated) DEVICE — SPONGE GZ W4XL4IN COT 12 PLY TYP VII WVN C FLD DSGN

## (undated) DEVICE — TELFA NON-ADHERENT PADS PREPAK: Brand: TELFA

## (undated) DEVICE — GOWN,SIRUS,FABRNF,XL,20/CS: Brand: MEDLINE

## (undated) DEVICE — SOLUTION IRRIG 1000ML H2O STRL BLT

## (undated) DEVICE — SYRINGE MED 20ML STD CLR PLAS LUERLOCK TIP N CTRL DISP

## (undated) DEVICE — 20 ML SYRINGE LUER-LOCK TIP: Brand: MONOJECT

## (undated) DEVICE — THE MILL DISPOSABLE - FINE

## (undated) DEVICE — INTENDED FOR TISSUE SEPARATION, AND OTHER PROCEDURES THAT REQUIRE A SHARP SURGICAL BLADE TO PUNCTURE OR CUT.: Brand: BARD-PARKER ®  SAFETY SCALPED

## (undated) DEVICE — BLADE ELECTRODE: Brand: EDGE

## (undated) DEVICE — 3M™ STERI-STRIP™ REINFORCED ADHESIVE SKIN CLOSURES, R1548, 1 IN X 5 IN (25 MM X 125 MM), 4 STRIPS/ENVELOPE: Brand: 3M™ STERI-STRIP™

## (undated) DEVICE — CATHETER IV 10GA L3IN OD3.3-3.5MM ID2.64-2.74MM BRN FEP

## (undated) DEVICE — (D)SYR 10ML 1/5ML GRAD NSAF -- PKGING CHANGE USE ITEM 338027

## (undated) DEVICE — TOWEL: OR BLU 80/CS: Brand: MEDICAL ACTION INDUSTRIES

## (undated) DEVICE — ADHESIVE TISS DERMA FLEX 0.7ML -- HIGH VISCOSITY

## (undated) DEVICE — SUTURE VCRL SZ 0 L18IN ABSRB UD L36MM CT-1 1/2 CIR J840D

## (undated) DEVICE — SSC BONE WAX: Brand: SSC BONE WAX

## (undated) DEVICE — CLIP NRV STIM DYN INLINE ACT M5

## (undated) DEVICE — NEEDLE HYPO 22GA L1.5IN BLK POLYPR HUB S STL REG BVL STR

## (undated) DEVICE — SYR LR LCK 1ML GRAD NSAF 30ML --

## (undated) DEVICE — KENDALL SCD EXPRESS SLEEVES, KNEE LENGTH, MEDIUM: Brand: KENDALL SCD

## (undated) DEVICE — 3M™ IOBAN™ 2 ANTIMICROBIAL INCISE DRAPE 6650EZ: Brand: IOBAN™ 2

## (undated) DEVICE — GAUZE,SPONGE,4"X4",16PLY,XRAY,STRL,LF: Brand: MEDLINE

## (undated) DEVICE — CODMAN® SURGICAL PATTIES 1/2" X 3" (1.27CM X 7.62CM): Brand: CODMAN®

## (undated) DEVICE — CATHETER IV 14GA L1.25IN ORNG POLY SFTY SYS FULL ENCASED

## (undated) DEVICE — REM POLYHESIVE ADULT PATIENT RETURN ELECTRODE: Brand: VALLEYLAB

## (undated) DEVICE — TOOL 14MH30D LEGEND 14CM 3MM MH DIAM: Brand: MIDAS REX ™

## (undated) DEVICE — NEEDLE NRV STIM BVL TIP INSUL PEDCL ACCS SYS FOR EMG MON

## (undated) DEVICE — DRAPE SHEET, X-LARGE: Brand: CONVERTORS

## (undated) DEVICE — STERILE POLYISOPRENE POWDER-FREE SURGICAL GLOVES: Brand: PROTEXIS

## (undated) DEVICE — HEX-LOCKING BLADE ELECTRODE: Brand: EDGE

## (undated) DEVICE — INTENDED FOR TISSUE SEPARATION, AND OTHER PROCEDURES THAT REQUIRE A SHARP SURGICAL BLADE TO PUNCTURE OR CUT.: Brand: BARD-PARKER ® DISPOSABLE SCALPELS

## (undated) DEVICE — PREP SKN DURAPREP 26ML APPL --

## (undated) DEVICE — REGULATOR S P NIST COND STD 1000

## (undated) DEVICE — 8FR FRAZIER SUCTION HANDLE: Brand: CARDINAL HEALTH

## (undated) DEVICE — 1010 S-DRAPE TOWEL DRAPE 10/BX: Brand: STERI-DRAPE™

## (undated) DEVICE — KIT JACK TBL PT CARE

## (undated) DEVICE — DRAIN KT WND 10FR RND 400ML --

## (undated) DEVICE — CODMAN® SURGICAL PATTIES 1/2" X1 1/2" (1.27CM X 3.81CM): Brand: CODMAN®

## (undated) DEVICE — 10FR FRAZIER SUCTION HANDLE: Brand: CARDINAL HEALTH

## (undated) DEVICE — INTENDED FOR TISSUE SEPARATION, AND OTHER PROCEDURES THAT REQUIRE A SHARP SURGICAL BLADE TO PUNCTURE OR CUT.: Brand: BARD-PARKER SAFETY BLADES SIZE 10, STERILE

## (undated) DEVICE — 12FR FRAZIER SUCTION HANDLE: Brand: CARDINAL HEALTH

## (undated) DEVICE — DRAPE MICSCP W52XL154IN XLN EXTRA W 2 BRDG STEREO OBS DISP

## (undated) DEVICE — NEEDLE HYPO 22GA L1.5IN BLK S STL HUB POLYPR SHLD REG BVL

## (undated) DEVICE — INCISE SURGICAL DRAPE: Brand: OPSITE INCISE 14X25CM CTN 20

## (undated) DEVICE — ELECTRODE BLDE L4IN NONINSULATED EDGE

## (undated) DEVICE — DECANTER VI C-FLO LF --

## (undated) DEVICE — SOLUTION IV 1000ML 0.9% SOD CHL

## (undated) DEVICE — SPINE PACK DEPAUL: Brand: MEDLINE INDUSTRIES, INC.

## (undated) DEVICE — SNAP KOVER: Brand: UNBRANDED

## (undated) DEVICE — KIT CATH OD16FR 5ML BLLN SIL URIN INDWL STR TIP INF CTRL

## (undated) DEVICE — SYR 10ML CTRL LR LCK NSAF LF --

## (undated) DEVICE — SUTURE VCRL SZ 2-0 L18IN ABSRB UD CT-1 L36MM 1/2 CIR J839D

## (undated) DEVICE — NDL PRT INJ NSAF BLNT 18GX1.5 --

## (undated) DEVICE — SLEEVE COMPR THGH LEN MED TEAR AWAY GARM SCD EXPRESS [DVT30] [MEDTRONIC COVIDIEN KENDALL]

## (undated) DEVICE — SUTURE MCRYL SZ 4-0 L18IN ABSRB UD L19MM PS-2 3/8 CIR PRIM Y496G

## (undated) DEVICE — TOOL 14MH30 LEGEND 14CM 3MM: Brand: MIDAS REX ™

## (undated) DEVICE — DERMABOND SKIN ADH 0.7ML -- DERMABOND ADVANCED 12/BX

## (undated) DEVICE — COVER SURG EQUIP DRP MICSCP 118IN LEN 43IN W ZEISS OPMI

## (undated) DEVICE — CODMAN® SURGICAL PATTIES 1/2" X 1/2" (1.27CM X 1.27CM): Brand: CODMAN®

## (undated) DEVICE — TOWEL SURG W16XL26IN BLU NONFENESTRATED DLX ST 2 PER PK

## (undated) DEVICE — ASTOUND STANDARD SURGICAL GOWN, XXL: Brand: CONVERTORS

## (undated) DEVICE — SKIN MARKER,REGULAR TIP WITH RULER AND LABELS: Brand: DEVON

## (undated) DEVICE — CATHETER DRNGE 32FR 4 WNG DISP FOR NEPHSTMY MALECOTS

## (undated) DEVICE — TRAP MUCUS SPECIMEN 40ML -- MEDICHOICE

## (undated) DEVICE — X-RAY DETECTABLE SPONGES,12 PLY: Brand: VISTEC

## (undated) DEVICE — PAD,NON-ADHERENT,3X8,STERILE,LF,1/PK: Brand: MEDLINE